# Patient Record
Sex: FEMALE | Race: WHITE | NOT HISPANIC OR LATINO | Employment: OTHER | URBAN - METROPOLITAN AREA
[De-identification: names, ages, dates, MRNs, and addresses within clinical notes are randomized per-mention and may not be internally consistent; named-entity substitution may affect disease eponyms.]

---

## 2017-06-13 ENCOUNTER — TRANSCRIBE ORDERS (OUTPATIENT)
Dept: ADMINISTRATIVE | Facility: HOSPITAL | Age: 72
End: 2017-06-13

## 2017-06-13 DIAGNOSIS — I70.213 ATHEROSCLEROSIS OF NATIVE ARTERY OF BOTH LOWER EXTREMITIES WITH INTERMITTENT CLAUDICATION (HCC): ICD-10-CM

## 2017-06-13 DIAGNOSIS — Z12.31 ENCOUNTER FOR SCREENING MAMMOGRAM FOR MALIGNANT NEOPLASM OF BREAST: Primary | ICD-10-CM

## 2017-06-13 DIAGNOSIS — I65.29 OCCLUSION AND STENOSIS OF UNSPECIFIED CAROTID ARTERY: ICD-10-CM

## 2017-06-13 DIAGNOSIS — I70.0 ATHEROSCLEROSIS OF AORTA (HCC): ICD-10-CM

## 2017-06-29 ENCOUNTER — HOSPITAL ENCOUNTER (OUTPATIENT)
Dept: RADIOLOGY | Facility: MEDICAL CENTER | Age: 72
Discharge: HOME | End: 2017-06-29
Payer: MEDICARE

## 2017-06-29 DIAGNOSIS — Z12.31 ENCOUNTER FOR SCREENING MAMMOGRAM FOR MALIGNANT NEOPLASM OF BREAST: ICD-10-CM

## 2017-07-03 ENCOUNTER — HOSPITAL ENCOUNTER (OUTPATIENT)
Dept: NON INVASIVE DIAGNOSTICS | Facility: CLINIC | Age: 72
Discharge: HOME | End: 2017-07-03
Payer: MEDICARE

## 2017-07-03 DIAGNOSIS — I70.0 ATHEROSCLEROSIS OF AORTA (HCC): ICD-10-CM

## 2017-07-03 DIAGNOSIS — I65.29 OCCLUSION AND STENOSIS OF UNSPECIFIED CAROTID ARTERY: ICD-10-CM

## 2017-07-03 DIAGNOSIS — I70.213 ATHEROSCLEROSIS OF NATIVE ARTERY OF BOTH LOWER EXTREMITIES WITH INTERMITTENT CLAUDICATION (HCC): ICD-10-CM

## 2017-07-03 PROCEDURE — 93923 UPR/LXTR ART STDY 3+ LVLS: CPT

## 2017-07-03 PROCEDURE — 93925 LOWER EXTREMITY STUDY: CPT

## 2017-07-03 PROCEDURE — 93978 VASCULAR STUDY: CPT

## 2017-07-03 PROCEDURE — 93880 EXTRACRANIAL BILAT STUDY: CPT

## 2017-07-28 ENCOUNTER — HOSPITAL ENCOUNTER (OUTPATIENT)
Dept: RADIOLOGY | Facility: MEDICAL CENTER | Age: 72
Discharge: HOME | End: 2017-07-28
Payer: MEDICARE

## 2017-07-28 DIAGNOSIS — Z12.31 ENCOUNTER FOR SCREENING MAMMOGRAM FOR MALIGNANT NEOPLASM OF BREAST: ICD-10-CM

## 2017-07-28 PROCEDURE — G0202 SCR MAMMO BI INCL CAD: HCPCS

## 2017-07-28 PROCEDURE — 77063 BREAST TOMOSYNTHESIS BI: CPT

## 2017-07-31 ENCOUNTER — GENERIC CONVERSION - ENCOUNTER (OUTPATIENT)
Dept: OTHER | Facility: OTHER | Age: 72
End: 2017-07-31

## 2018-01-13 NOTE — MISCELLANEOUS
Message  pt called req refill on nictoine patches  She is doing well, is not smoking  she's afraid to not use the patch yet, she doesn't want to relapse  per DERIC VÁSQUEZ ok to refill  refill submitted to pt's pharmacy  Active Problems    1  Abdominal aortic atherosclerosis (440 0) (I70 0)   2  Arterial ectasia (447 8) (I77 9)   3  Atherosclerosis of native artery of both lower extremities with intermittent claudication   (440 21) (I70 213)   4  Carotid artery stenosis (433 10) (I65 29)   5  Hypertension (401 9) (I10)   6  Nicotine dependence (305 1) (F17 200)    Current Meds   1  Adult Aspirin EC Low Strength 81 MG Oral Tablet Delayed Release; TAKE 1 TABLET   DAILY; Therapy: [id number] to (Lucretia Jaffe)  Requested for: [id number]; Last   Rx:[id number] Ordered   2  ALPRAZolam 0 25 MG Oral Tablet; Therapy: (Recorded:17Jun2013) to Recorded   3  AmLODIPine Besylate 5 MG Oral Tablet; Therapy: (Recorded:13May2016) to Recorded   4  CloNIDine HCl - 0 2 MG Oral Tablet; Therapy: (Recorded:17Jun2013) to Recorded   5  Losartan Potassium-HCTZ 100-25 MG Oral Tablet; Therapy: (Recorded:17Jun2013) to Recorded   6  Metoprolol Succinate  MG Oral Tablet Extended Release 24 Hour; Therapy: (Recorded:17Jun2013) to Recorded   7  Nicotine 14 MG/24HR Transdermal Patch 24 Hour; APPLY 1 PATCH DAILY AS   DIRECTED; Therapy: [id number] to (Evaluate:12Jun2016)  Requested for: 45VJF9392; Last   Rx:[id number] Ordered    Allergies    1   Codeine Sulfate TABS    Plan  Nicotine dependence    · Nicotine 14 MG/24HR Transdermal Patch 24 Hour; APPLY 1 PATCH DAILY AS  DIRECTED    Signatures   Electronically signed by : Petey Rivera, ; Jun 30 2016  3:56PM EST                       (Author)

## 2018-01-16 NOTE — RESULT NOTES
Verified Results  MAMMO SCREENING BILATERAL W 3D & CAD [id number] 02:28PM Julio Luz     Test Name Result Flag Reference   MAMMO SCREENING BILATERAL W 3D & CAD (Report)     Patient History:   Patient is postmenopausal and had first child at age 35  Family history of ovarian cancer at age 48 in maternal aunt,    endometrial cancer at age 46 in sister  No Hormone Replacement Therapy   Patient is an every day smoker, and has smoked for 53 years  Patient's BMI is 23 3  Reason for exam: screening, asymptomatic  Mammo Screening Bilateral W DBT and CAD: July 28, 2017 - Check In   #: [de-identified]   2D/3D Procedure   3D views: Bilateral MLO view(s) were taken  2D views: Bilateral CC view(s) were taken  Technologist: RT TANK Loyns   Prior study comparison: April 14, 2015, digital bilateral    screening mammogram, performed at [address]  April 8, 2014, digital bilateral screening mammogram, performed    at [address] [facility] [address]  June 27, 2012, digital bilateral   screening mammogram, performed at [address] [facility] [address]  June 22, 2011, digital bilateral screening mammogram, performed    at [address] [facility] [address]  December 18, 2009, digital    bilateral screening mammogram, performed at [address]  There are scattered fibroglandular densities  No dominant soft tissue mass, architectural distortion or    suspicious calcifications are noted in either breast  The skin    and nipple contours are within normal limits  No significant changes when compared with prior studies  ACR BI-RADSï¾® Assessments: BiRad:1 - Negative     Recommendation:   Routine screening mammogram of both breasts in 1 year  A    reminder letter will be scheduled  The patient is scheduled in a reminder system  8-10% of cancers will be missed on mammography  Management of a    palpable abnormality must be based on clinical grounds   Patients will be notified of their results via letter from our facility  Accredited by [employer] and FDA       Transcription Location: [address]: [id number]     Risk Value(s):   Tyrer-Cuzick 10 Year: 3 100%, Tyrer-Cuzick Lifetime: 4 500%,    Myriad Table: 3 0%, VIRGINIE 5 Year: 2 6%, NCI Lifetime: 7 2%

## 2018-01-17 NOTE — MISCELLANEOUS
Message  Received automated renewal request from [facility] pharmacy for Nicotine patch  At patient's last office visit we discussed smoking cessation and script was given for patches  Called her today and LMOM inquiring as to how smoking cessation was going and asked that she call the office if she does in fact need a refill on Nicotine patches or needs further assistance with cessation efforts        Signatures   Electronically signed by : Elia Mohan; Tyshawn 10 2016  4:36PM EST                       (Author)

## 2018-07-27 DIAGNOSIS — I70.213 ATHEROSCLEROSIS OF NATIVE ARTERY OF BOTH LOWER EXTREMITIES WITH INTERMITTENT CLAUDICATION (HCC): ICD-10-CM

## 2018-07-27 DIAGNOSIS — I65.23 BILATERAL CAROTID ARTERY STENOSIS: ICD-10-CM

## 2018-07-27 DIAGNOSIS — I70.0 ATHEROSCLEROSIS OF AORTA (HCC): Primary | ICD-10-CM

## 2018-08-31 ENCOUNTER — HOSPITAL ENCOUNTER (OUTPATIENT)
Dept: NON INVASIVE DIAGNOSTICS | Facility: CLINIC | Age: 73
Discharge: HOME | End: 2018-08-31
Payer: MEDICARE

## 2018-08-31 DIAGNOSIS — I70.0 ATHEROSCLEROSIS OF AORTA (HCC): ICD-10-CM

## 2018-08-31 DIAGNOSIS — I70.213 ATHEROSCLEROSIS OF NATIVE ARTERY OF BOTH LOWER EXTREMITIES WITH INTERMITTENT CLAUDICATION (HCC): ICD-10-CM

## 2018-08-31 DIAGNOSIS — I65.23 BILATERAL CAROTID ARTERY STENOSIS: ICD-10-CM

## 2018-08-31 PROCEDURE — 93922 UPR/L XTREMITY ART 2 LEVELS: CPT | Performed by: SURGERY

## 2018-08-31 PROCEDURE — 93978 VASCULAR STUDY: CPT | Performed by: SURGERY

## 2018-08-31 PROCEDURE — 93880 EXTRACRANIAL BILAT STUDY: CPT | Performed by: SURGERY

## 2018-08-31 PROCEDURE — 93923 UPR/LXTR ART STDY 3+ LVLS: CPT

## 2018-08-31 PROCEDURE — 93978 VASCULAR STUDY: CPT

## 2018-08-31 PROCEDURE — 93925 LOWER EXTREMITY STUDY: CPT | Performed by: SURGERY

## 2018-08-31 PROCEDURE — 93880 EXTRACRANIAL BILAT STUDY: CPT

## 2018-08-31 PROCEDURE — 93925 LOWER EXTREMITY STUDY: CPT

## 2018-09-18 ENCOUNTER — OFFICE VISIT (OUTPATIENT)
Dept: VASCULAR SURGERY | Facility: CLINIC | Age: 73
End: 2018-09-18
Payer: MEDICARE

## 2018-09-18 VITALS
WEIGHT: 146 LBS | SYSTOLIC BLOOD PRESSURE: 116 MMHG | DIASTOLIC BLOOD PRESSURE: 60 MMHG | BODY MASS INDEX: 24.92 KG/M2 | RESPIRATION RATE: 18 BRPM | TEMPERATURE: 97.9 F | HEART RATE: 72 BPM | HEIGHT: 64 IN

## 2018-09-18 DIAGNOSIS — I10 ESSENTIAL HYPERTENSION: ICD-10-CM

## 2018-09-18 DIAGNOSIS — I65.23 BILATERAL CAROTID ARTERY STENOSIS: ICD-10-CM

## 2018-09-18 DIAGNOSIS — E78.2 MIXED HYPERLIPIDEMIA: ICD-10-CM

## 2018-09-18 DIAGNOSIS — I70.213 ATHEROSCLEROSIS OF NATIVE ARTERY OF BOTH LOWER EXTREMITIES WITH INTERMITTENT CLAUDICATION (HCC): ICD-10-CM

## 2018-09-18 DIAGNOSIS — F17.210 CIGARETTE NICOTINE DEPENDENCE WITHOUT COMPLICATION: ICD-10-CM

## 2018-09-18 DIAGNOSIS — I71.4 ABDOMINAL AORTIC ANEURYSM (AAA) WITHOUT RUPTURE (HCC): Primary | ICD-10-CM

## 2018-09-18 PROBLEM — M54.9 BACK PAIN: Status: ACTIVE | Noted: 2017-11-03

## 2018-09-18 PROBLEM — I71.40 ABDOMINAL AORTIC ANEURYSM (AAA) WITHOUT RUPTURE: Status: ACTIVE | Noted: 2017-11-04

## 2018-09-18 PROCEDURE — 99214 OFFICE O/P EST MOD 30 MIN: CPT | Performed by: NURSE PRACTITIONER

## 2018-09-18 RX ORDER — ALPRAZOLAM 0.25 MG/1
TABLET ORAL
COMMUNITY
Start: 2018-08-17

## 2018-09-18 RX ORDER — METOPROLOL SUCCINATE 50 MG/1
50 TABLET, EXTENDED RELEASE ORAL
COMMUNITY
Start: 2017-10-24 | End: 2019-09-12 | Stop reason: ALTCHOICE

## 2018-09-18 RX ORDER — LOSARTAN POTASSIUM AND HYDROCHLOROTHIAZIDE 12.5; 1 MG/1; MG/1
TABLET ORAL
COMMUNITY
Start: 2018-09-05

## 2018-09-18 RX ORDER — ATORVASTATIN CALCIUM 40 MG/1
40 TABLET, FILM COATED ORAL
COMMUNITY
Start: 2017-10-24 | End: 2022-07-28

## 2018-09-18 RX ORDER — AMLODIPINE BESYLATE 5 MG/1
TABLET ORAL
COMMUNITY
Start: 2018-09-05

## 2018-09-18 NOTE — ASSESSMENT & PLAN NOTE
B/L asymptomatic carotid artery stenosis  -8/31/18 Carotid duplex reviewed with patient; 1-49% B/L stenosis  -patient denies any signs or symptoms of TIA/CVA including changes in vision, amaurosis fugax, unilateral extremity weaknesses, slurred speech or speech difficulties   -She knows to monitor for s/s of TIA/CVA and expressed understanding of calling 911 to go to the ER for evaluation

## 2018-09-18 NOTE — ASSESSMENT & PLAN NOTE
Infrarenal AAA without rupture  -8/31/18 AOI with stable infrarenal AAA 2 4cm  -denies any chest pain, back pain, abdominal pain  -continue on ASA, statin therapy  -continue with smoking cessation; counseling provided  -annual surveillance

## 2018-09-18 NOTE — LETTER
September 18, 2018     [address]  [address] [address]    Patient: Lazara Drake   YOB: 1945   Date of Visit: 9/18/2018       Dear Dr Mickey Howell: Thank you for referring Zak Petit to me for evaluation  Below are my notes for this consultation  If you have questions, please do not hesitate to call me  I look forward to following your patient along with you           Sincerely,        THALIA Velazquez        CC: No Recipients

## 2018-09-18 NOTE — ASSESSMENT & PLAN NOTE
PAD with intermittent claudication and intermittent rest pain  -patient reports very occasional claudication and rest pain  -she is ambulating without difficulty  -she is on ASA/Statin therapy  -we discussed the importance of walking daily, pushing through the pain or taking a break and continuing  She expressed understanding  We also discussed smoking cessation and the relationship of nicotine and atherosclerotic disease    She did quit for a few months, she will try again    -we discussed symptoms of PAD, she will call for re-evaluation if her symptoms worsen

## 2018-09-18 NOTE — PROGRESS NOTES
Assessment/Plan:    Abdominal aortic aneurysm (AAA) without rupture (HCC)  Infrarenal AAA without rupture  -8/31/18 AOI with stable infrarenal AAA 2 4cm  -denies any chest pain, back pain, abdominal pain  -continue on ASA, statin therapy  -continue with smoking cessation; counseling provided  -annual surveillance    Atherosclerosis of native artery of both lower extremities with intermittent claudication (HCC)  PAD with intermittent claudication and intermittent rest pain  -patient reports very occasional claudication and rest pain  -she is ambulating without difficulty  -she is on ASA/Statin therapy  -we discussed the importance of walking daily, pushing through the pain or taking a break and continuing  She expressed understanding  We also discussed smoking cessation and the relationship of nicotine and atherosclerotic disease  She did quit for a few months, she will try again    -we discussed symptoms of PAD, she will call for re-evaluation if her symptoms worsen    Carotid artery stenosis  B/L asymptomatic carotid artery stenosis  -8/31/18 Carotid duplex reviewed with patient; 1-49% B/L stenosis  -patient denies any signs or symptoms of TIA/CVA including changes in vision, amaurosis fugax, unilateral extremity weaknesses, slurred speech or speech difficulties   -She knows to monitor for s/s of TIA/CVA and expressed understanding of calling 911 to go to the ER for evaluation     Essential hypertension  HTN managed by PCP      Mixed hyperlipidemia  Continue on Statin therapy    Nicotine dependence  Patient reports she is smoking 6-8 cigarettes a day which is decreased from her previous use  She has been able to quit in the past and will attempt again  Counseling and education provided  Patient expressed understanding  Diagnoses and all orders for this visit:    Abdominal aortic aneurysm (AAA) without rupture ([address] )  -     VAS abdominal aorta/iliacs; complete study;  Future    Atherosclerosis of native artery of both lower extremities with intermittent claudication (HCC)  -     VAS lower limb arterial duplex, complete bilateral; Future  -     VAS abdominal aorta/iliacs; complete study; Future    Bilateral carotid artery stenosis  -     VAS carotid complete study; Future    Essential hypertension    Mixed hyperlipidemia    Cigarette nicotine dependence without complication    Other orders  -     aspirin 81 MG tablet; Take 1 tablet by mouth daily  -     ALPRAZolam (XANAX) 0 25 mg tablet;   -     amLODIPine (NORVASC) 5 mg tablet;   -     atorvastatin (LIPITOR) 40 mg tablet; Take 40 mg by mouth  -     cholecalciferol (VITAMIN D3) 1,000 units tablet; Take 1,000 Units by mouth  -     Thiamine HCl (VITAMIN B-1 PO); Take 1 tablet by mouth  -     losartan-hydrochlorothiazide (HYZAAR) 100-12 5 MG per tablet;   -     Flaxseed Oil OIL; Take 1 tablet by mouth  -     metoprolol succinate (TOPROL-XL) 50 mg 24 hr tablet; Take 50 mg by mouth  -     Multiple Vitamins-Minerals (MULTIVITAMIN ADULT PO); Take 1 capsule by mouth          Subjective:      Patient ID: Olivia Palmer is a 67 y o  female  Patient had BLAYNE, AOIL and CV on 8/31  She complaints of intermittent rest pain in L calf  She has intermittent abdominal pain and bloating  She has chronic back pain that is constant  She denies TIA or CVA like symptoms  She admits to tobacco use less than 1/2 PPD  Rona Bernheim is a 60-year-old female with past medical history of HTN, HLD, P 80, infrarenal aneurysm, smoker who presents to review her recent testing  Patient had carotid artery duplex on 08/31/2018 which was reviewed with the patient  Patient denies any signs and symptoms of TIA/CVA including amaurosis fugax or vision changes, unilateral extremity weaknesses, speech or swallowing difficulties  She is still smoking 6-8 cigarettes per day  She is taking aspirin every other day and is on statin therapy      Patient had AOI and LEAD on 08/31/2018 which was reviewed with the patient  Patient reports that she has some pain to her left leg which occurs on occasion at night and very occasionally while she is walking  She does walk, take a break for about 1 minutes the pain subsided and she is able to continue with walking she denies any wounds or ulcerations to either of her legs  Patient also denies any back pain, chest pain, abdominal pain  She denies any lightheadedness or dizziness  The following portions of the patient's history were reviewed and updated as appropriate: allergies, current medications, past family history, past medical history, past social history, past surgical history and problem list     Review of Systems   Constitutional: Negative  Negative for chills and fever  HENT: Negative  Eyes: Negative  Respiratory: Negative  Negative for shortness of breath  Cardiovascular: Negative for chest pain, palpitations and leg swelling  Gastrointestinal: Positive for abdominal pain  Endocrine: Negative  Genitourinary: Negative  Musculoskeletal: Positive for arthralgias and back pain  Skin: Negative  Allergic/Immunologic: Negative  Neurological: Negative  Negative for dizziness, facial asymmetry, speech difficulty, weakness, light-headedness, numbness and headaches  Hematological: Negative  Psychiatric/Behavioral: Negative  Objective:      /60 (BP Location: Right arm, Patient Position: Sitting, Cuff Size: Adult)   Pulse 72   Temp 97 9 °F (36 6 °C) (Tympanic)   Resp 18   Ht 5' 4" (1 626 m)   Wt 66 2 kg (146 lb)   BMI 25 06 kg/m²          Physical Exam   Constitutional: She is oriented to person, place, and time  She appears well-developed and well-nourished  No distress  HENT:   Head: Normocephalic and atraumatic  Eyes: EOM are normal  Pupils are equal, round, and reactive to light  No scleral icterus  Neck: Normal range of motion  Carotid bruit is present         Cardiovascular: Normal rate, regular rhythm and normal heart sounds  Pulses:       Carotid pulses are 2+ on the right side, and 2+ on the left side  Radial pulses are 2+ on the right side, and 2+ on the left side  Dorsalis pedis pulses are 1+ on the right side, and 2+ on the left side  Posterior tibial pulses are 0 on the right side, and 0 on the left side  Pulmonary/Chest: Effort normal and breath sounds normal  No respiratory distress  Abdominal: Soft  Bowel sounds are normal    Musculoskeletal: Normal range of motion  Neurological: She is alert and oriented to person, place, and time  She has normal strength  Skin: Skin is warm, dry and intact  Psychiatric: She has a normal mood and affect  Her speech is normal and behavior is normal  Judgment and thought content normal  Cognition and memory are normal    Nursing note and vitals reviewed  Vitals:    09/18/18 1411   BP: 116/60   BP Location: Right arm   Patient Position: Sitting   Cuff Size: Adult   Pulse: 72   Resp: 18   Temp: 97 9 °F (36 6 °C)   TempSrc: Tympanic   Weight: 66 2 kg (146 lb)   Height: 5' 4" (1 626 m)       Patient Active Problem List   Diagnosis    Abdominal aortic aneurysm (AAA) without rupture (HCC)    Atherosclerosis of native artery of both lower extremities with intermittent claudication (HCC)    Back pain    Bradycardia    Carotid artery stenosis    Cervical spinal stenosis    Diverticulosis of large intestine without hemorrhage    Essential hypertension    History of colonic polyps    Mixed hyperlipidemia    Nicotine dependence       History reviewed  No pertinent surgical history  Family History   Problem Relation Age of Onset    Congenital heart disease Mother     Lung cancer Father     Alcohol abuse Father        Social History     Social History    Marital status:      Spouse name: N/A    Number of children: N/A    Years of education: N/A     Occupational History    Not on file       Social History Main Topics  Smoking status: Current Every Day Smoker     Packs/day: 0 25    Smokeless tobacco: Never Used    Alcohol use Not on file    Drug use: Unknown    Sexual activity: Not on file     Other Topics Concern    Not on file     Social History Narrative    No narrative on file       Allergies   Allergen Reactions    Codeine Vomiting         Current Outpatient Prescriptions:     ALPRAZolam (XANAX) 0 25 mg tablet, , Disp: , Rfl:     amLODIPine (NORVASC) 5 mg tablet, , Disp: , Rfl:     aspirin 81 MG tablet, Take 1 tablet by mouth daily, Disp: , Rfl:     atorvastatin (LIPITOR) 40 mg tablet, Take 40 mg by mouth, Disp: , Rfl:     cholecalciferol (VITAMIN D3) 1,000 units tablet, Take 1,000 Units by mouth, Disp: , Rfl:     Flaxseed Oil OIL, Take 1 tablet by mouth, Disp: , Rfl:     losartan-hydrochlorothiazide (HYZAAR) 100-12 5 MG per tablet, , Disp: , Rfl:     metoprolol succinate (TOPROL-XL) 50 mg 24 hr tablet, Take 50 mg by mouth, Disp: , Rfl:     Multiple Vitamins-Minerals (MULTIVITAMIN ADULT PO), Take 1 capsule by mouth, Disp: , Rfl:     Thiamine HCl (VITAMIN B-1 PO), Take 1 tablet by mouth, Disp: , Rfl:

## 2018-09-18 NOTE — ASSESSMENT & PLAN NOTE
Patient reports she is smoking 6-8 cigarettes a day which is decreased from her previous use  She has been able to quit in the past and will attempt again  Counseling and education provided  Patient expressed understanding

## 2018-09-18 NOTE — PATIENT INSTRUCTIONS
Continue Aspirin and statin therapy daily as we discussed  Continue to exercise and ambulate daily  If you have any signs or symptoms of TIA/CVA as we discussed, call 911 immediately for assistance  If you have any change to the symptoms in your legs, please call for re-evaluation  Otherwise, we will do testing next year and see you in our office  Nonruptured Abdominal Aortic Aneurysm   AMBULATORY CARE:   What you need to know about an abdominal aortic aneurysm (AAA): The aorta is a large blood vessel that extends from your heart to your abdomen  The part of the aorta that extends into your abdomen is called your abdominal aorta  Your abdominal aorta brings blood to your stomach, pelvis, and legs  An AAA is a bulging or weak area in your abdominal aorta  Over time, the bulge may grow and is at risk for tearing or rupturing  An AAA that ruptures is a life-threatening emergency  Signs and symptoms: An AAA usually does not have signs or symptoms if it has not ruptured  If the AAA starts to leak or ruptures, you may have any of the following:  · Sudden pain in your abdomen, groin, back, legs, or buttocks    · Nausea and vomiting    · A lump or swelling in your abdomen    · Stiff abdominal muscles    · Numbness or tingling in your legs    · Pale, sweaty, or clammy skin    · Dizziness, fainting or loss of consciousness  Call 911 or have someone else call for any of the following:   · You faint or lose consciousness  · You cannot be woken  Seek care immediately if:  The following signs or symptoms may mean the AAA is at risk of rupturing:  · You have sudden sharp pain in your abdomen, groin, back, legs, or buttocks  · You have nausea and vomiting  · You feel dizzy  · You have stiffness or swelling in your abdomen, or a lump in your abdomen  · You have numbness or tingling in your legs  · Your skin is pale, sweaty, or clammy    Contact your healthcare provider if:   · You have questions or concerns about your condition or care  Treatment of an AAA  may not be needed  Your healthcare provider may monitor the size of your AAA with tests, such as an ultrasound  You may be given medicines to prevent the AAA from growing  Examples include blood pressure medicine and medicine to lower your cholesterol  If your AAA gets bigger, starts to leak, or ruptures, you may need any of the following:  · Endovascular repair  is a procedure that uses a graft to repair your AAA  The graft stops blood flow to the aneurysm and protects your abdominal aorta  You may need to have more than 1 endovascular repair  · Open repair  is surgery to repair or remove an AAA  Manage a nonruptured AAA:  You can help prevent your AAA from growing or rupturing by doing the following:  · Do not smoke  Nicotine and other chemicals in cigarettes and cigars can increase your blood pressure  It can also damage your aorta and increase the size of your AAA  Ask your healthcare provider for information if you currently smoke and need help to quit  E-cigarettes or smokeless tobacco still contain nicotine  Talk to your healthcare provider before you use these products  · Exercise as directed  Exercise can help control your blood pressure and cholesterol level  Ask your healthcare provider how much exercise you need each day and which exercises are best for you  · Follow the meal plan recommended by your healthcare provider  Talk to your dietitian about a heart-healthy or low-sodium eating plan  Meal plans will help you lower your cholesterol and blood pressure  They will also help you reach a healthy weight  · Do not lift anything heavier than 10 pounds  Heavy lifting can increase pressure in your abdominal aorta  This can increase your risk for a ruptured AAA  Follow up with your healthcare provider as directed: You will need regular tests and follow-up visits to monitor the size of your AAA   Keep all appointments  Write down your questions so you remember to ask them during your visits  © 2017 [employer] Information is for End User's use only and may not be sold, redistributed or otherwise used for commercial purposes  All illustrations and images included in CareNotes® are the copyrighted property of [employer]  or [employer]hakar [employer]  The above information is an  only  It is not intended as medical advice for individual conditions or treatments  Talk to your doctor, nurse or pharmacist before following any medical regimen to see if it is safe and effective for you  Peripheral Artery Disease   WHAT YOU NEED TO KNOW:   What is peripheral artery disease? Peripheral artery disease (PAD) is narrow, weak, or blocked arteries  It may affect any arteries outside of your heart and brain  PAD is usually the result of a buildup of fat and cholesterol, also called plaque, along your artery walls  Inflammation, a blood clot, or abnormal cell growth could also block your arteries  PAD prevents normal blood flow to your legs and arms  You are at risk of an amputation if poor blood flow keeps wounds from healing or causes gangrene (tissue death)  Without treatment, PAD can also cause a heart attack or stroke  What increases my risk for PAD? · Smoking cigarettes     · Diabetes     · High blood pressure     · High cholesterol     · Age older than 40 years    · Heart disease or a family history of heart disease  What are the signs and symptoms of PAD? Mild PAD usually does not cause symptoms   As the disease worsens over time, you may have the following:  · Pain or cramps in your leg or hip while you walk     · A numb, weak, or heavy feeling in your legs     · Dry, scaly, red, or pale skin on your legs     · Thick or brittle nails, or hair loss on your arms and legs     · Foot sores that will not heal     · Burning or aching in your feet and toes while resting (this may be worse when you lie down)  How is PAD diagnosed? · Angiography  is a test that shows pictures of the arteries in your arms and legs  You will be given contrast liquid to help the arteries show up better on the pictures  The pictures will be taken with an MRI or CT scan  Tell the healthcare provider if you have ever had an allergic reaction to contrast liquid  Do not enter the MRI room with anything metal  Metal can cause serious injury  Tell a healthcare provider if you have any metal in or on your body  · Doppler ankle brachial index (GEN)  is a test that compares blood pressure in your ankles to blood pressure in your arms  This tells your healthcare provider how well blood is flowing through the arteries in your legs  How is PAD treated? Treatment can help reduce your risk of a heart attack, stroke, or amputation  You may need more than one of the following:  · Medicines  may be given  Your healthcare provider may give you any of the following:     ¨ Antiplatelet medicine,  such as aspirin, helps prevent blood clots and reduces the risk of a heart attack or stroke  ¨ Statin medicine  helps lower your cholesterol and prevents your PAD from getting worse  · A supervised exercise program  helps you stay active in normal daily activities and may prevent disability  Healthcare providers will help you safely walk or do strength training exercises 3 times a week for 30 to 60 minutes  You will do this for several months, then transition to walking on your own  · Angioplasty  is a procedure to open your artery so blood can flow through normally  A thin tube called a catheter is used to insert a small balloon into your artery  The balloon is inflated to open your blocked artery, and then removed  A tube called a stent may be placed in your artery to hold it open  · Bypass surgery  is used to make a new connection to your artery with a vein from another part of your body, or an artificial graft   The vein or graft is attached to your artery above and below your blockage  This allows blood to flow around the blocked portion of your artery  How can I manage PAD? · Do not smoke  Nicotine and other chemicals in cigarettes and cigars can worsen PAD  They can also increase your risk for a heart attack or stroke  Ask your healthcare provider for information if you currently smoke and need help to quit  E-cigarettes or smokeless tobacco still contain nicotine  Talk to your healthcare provider before you use these products  · Manage other health conditions  Take your medicines as directed  Follow your healthcare provider's instructions if you have high blood pressure or high cholesterol  Perform foot care and check your blood sugar levels as directed if you have diabetes  · Eat heart healthy foods  Eat whole grains, fruits, and vegetables every day  Limit salt and high-fat foods  Ask your healthcare provider for more information on a heart healthy diet  Ask if you need to lose weight  Your healthcare provider can help you create a healthy weight-loss plan  Call 911 for the following:   · You have any of the following signs of a heart attack:      ¨ Squeezing, pressure, or pain in your chest that lasts longer than 5 minutes or returns    ¨ Discomfort or pain in your back, neck, jaw, stomach, or arm     ¨ Trouble breathing    ¨ Nausea or vomiting    ¨ Lightheadedness or a sudden cold sweat, especially with chest pain or trouble breathing    · You have any of the following signs of a stroke:      ¨ Numbness or drooping on one side of your face     ¨ Weakness in an arm or leg    ¨ Confusion or difficulty speaking    ¨ Dizziness, a severe headache, or vision loss  When should I seek immediate care? · You have sores or wounds that will not heal      · You notice black or discolored skin on your arm or leg  · Your skin is cool to the touch  When should I contact my healthcare provider?    · You have leg pain when you walk 1/8 mile (200 meters) or less, even with treatment  · Your legs are red, dry, or pale, even with treatment  · You have questions or concerns about your condition or care  CARE AGREEMENT:   You have the right to help plan your care  Learn about your health condition and how it may be treated  Discuss treatment options with your caregivers to decide what care you want to receive  You always have the right to refuse treatment  The above information is an  only  It is not intended as medical advice for individual conditions or treatments  Talk to your doctor, nurse or pharmacist before following any medical regimen to see if it is safe and effective for you  © 2017 [employer] Information is for End User's use only and may not be sold, redistributed or otherwise used for commercial purposes  All illustrations and images included in CareNotes® are the copyrighted property of [employer]  or [employer]ar Heath  Carotid Artery Disease   AMBULATORY CARE:   Carotid artery disease  is a condition that causes narrow or blocked carotid arteries  Your carotid arteries are the blood vessels that supply your brain with most of the blood it needs to work  You have 2 carotid arteries, one on each side of your neck          Call 911 for any of the following:   · You have any of the following signs of a stroke:      ¨ Numbness or drooping on one side of your face     ¨ Weakness in an arm or leg    ¨ Confusion or difficulty speaking    ¨ Dizziness, a severe headache, or vision loss    · You have any of the following signs of a heart attack:      ¨ Squeezing, pressure, or pain in your chest that lasts longer than 5 minutes or returns    ¨ Discomfort or pain in your back, neck, jaw, stomach, or arm     ¨ Trouble breathing    ¨ Nausea or vomiting    ¨ Lightheadedness or a sudden cold sweat, especially with chest pain or trouble breathing  Contact your healthcare provider if:   · You have questions or concerns about your condition or care  Signs and symptoms of carotid artery disease: You may have no signs or symptoms  Most commonly, carotid artery disease causes transient ischemic attacks (TIAs), or mini-strokes  You may have numbness, weakness, lack of movement, or vision or speech problems  A TIA goes away quickly and does not cause permanent damage  A TIA may be a warning sign that you are about to have a stroke  If you have any symptoms of a TIA or stroke, seek care immediately  Warning signs of a stroke: The word F A S T  can help you remember and recognize warning signs of a stroke  · F = Face:  One side of the face droops  · A = Arms:  One arm starts to drop when both arms are raised  · S = Speech:  Speech is slurred or sounds different than usual     · T = Time:  A person who is having a stroke needs to be seen immediately  A stroke is a medical emergency that needs immediate treatment  Some medicines and treatments work best if given within a few hours of a stroke  Treatment  for carotid artery disease depends on how narrow your arteries have become, your symptoms, and your general health  The goal of treatment is to lower your risk for a stroke  You may need any of the following:  · Take aspirin if directed  Your healthcare provider may suggest that you take an aspirin a day to prevent blood clots from forming in the carotid arteries  If your healthcare provider wants you to take aspirin daily, do not take acetaminophen or ibuprofen instead  · Control risk factors  High blood pressure, high cholesterol, heart disease, diabetes, and being overweight increase your risk for atherosclerosis  · Procedures can help open blocked arteries  A carotid endarterectomy is used to cut plaque out of the artery  An angioplasty is used to push the plaque against the artery wall with a balloon device  Sometimes a stent is placed during an angioplasty   A stent is a metal mesh tube that is placed in the artery to keep it open  Manage carotid artery disease:   · Eat a variety of healthy foods  Healthy foods include fruit, vegetables, whole-grain breads, low-fat dairy products, lean meat, and fish  Choose fish that are high in omega-3 fatty acids, such as salmon and fresh tuna  Ask your healthcare provider for more information on a heart healthy diet and the DASH eating plan  · Limit sodium (salt)  Sodium may increase your blood pressure  Add less table salt to your foods  Read food labels and choose foods that are low in sodium  Your healthcare provider may suggest you follow a low sodium diet  · Reach or maintain a healthy weight  Extra weight makes your heart work harder  Ask your healthcare provider how much you should weight  He can help you create a safe weight loss plan  Even a weight loss of 10% of your body weight can help your heart function better  · Exercise as directed  Exercise helps improve heart function and can help you manage your weight  Exercise can also help lower your cholesterol and blood sugar levels  Try to get at least 30 minutes of exercise at least 5 times each week  Try to be active every day  Your healthcare provider can help you create an exercise plan that works best for you  · Limit alcohol  Alcohol can increase your blood pressure and triglyceride levels  Men should limit alcohol to 2 drinks per day  Women should limit alcohol to 1 drink per day  A drink of alcohol is 12 ounces of beer, 5 ounces of wine, or 1½ ounces of liquor  · Do not smoke  Nicotine and other chemicals in cigarettes and cigars can cause heart and lung damage  Ask your healthcare provider for information if you currently smoke and need help to quit  E-cigarettes or smokeless tobacco still contain nicotine  Talk to your healthcare provider before you use these products    Follow up with your healthcare provider as directed:  Write down your questions so you remember to ask them during your visits  © 2017 [employer] Information is for End User's use only and may not be sold, redistributed or otherwise used for commercial purposes  All illustrations and images included in CareNotes® are the copyrighted property of [employer]  or [employer]  The above information is an  only  It is not intended as medical advice for individual conditions or treatments  Talk to your doctor, nurse or pharmacist before following any medical regimen to see if it is safe and effective for you

## 2018-10-22 ENCOUNTER — TRANSCRIBE ORDERS (OUTPATIENT)
Dept: ADMINISTRATIVE | Facility: HOSPITAL | Age: 73
End: 2018-10-22

## 2018-10-22 DIAGNOSIS — Z12.31 ENCOUNTER FOR SCREENING MAMMOGRAM FOR MALIGNANT NEOPLASM OF BREAST: Primary | ICD-10-CM

## 2018-11-08 PROBLEM — Z86.010 HX OF COLONIC POLYPS: Status: ACTIVE | Noted: 2018-11-08

## 2018-11-08 PROBLEM — K57.90 DIVERTICULOSIS OF INTESTINE WITHOUT BLEEDING: Status: ACTIVE | Noted: 2018-11-08

## 2018-11-08 PROBLEM — Z86.0100 HX OF COLONIC POLYPS: Status: ACTIVE | Noted: 2018-11-08

## 2018-11-21 ENCOUNTER — HOSPITAL ENCOUNTER (OUTPATIENT)
Dept: RADIOLOGY | Facility: MEDICAL CENTER | Age: 73
Discharge: HOME | End: 2018-11-21
Payer: MEDICARE

## 2018-11-21 VITALS — BODY MASS INDEX: 24.92 KG/M2 | HEIGHT: 64 IN | WEIGHT: 146 LBS

## 2018-11-21 DIAGNOSIS — Z12.31 ENCOUNTER FOR SCREENING MAMMOGRAM FOR MALIGNANT NEOPLASM OF BREAST: ICD-10-CM

## 2018-11-21 PROCEDURE — 77063 BREAST TOMOSYNTHESIS BI: CPT

## 2018-11-21 PROCEDURE — 77067 SCR MAMMO BI INCL CAD: CPT

## 2018-12-03 ENCOUNTER — ANESTHESIA EVENT (OUTPATIENT)
Dept: GASTROENTEROLOGY | Facility: HOSPITAL | Age: 73
End: 2018-12-03
Payer: MEDICARE

## 2018-12-03 ENCOUNTER — ANESTHESIA (OUTPATIENT)
Dept: GASTROENTEROLOGY | Facility: HOSPITAL | Age: 73
End: 2018-12-03
Payer: MEDICARE

## 2018-12-03 ENCOUNTER — HOSPITAL ENCOUNTER (OUTPATIENT)
Facility: HOSPITAL | Age: 73
Setting detail: OUTPATIENT SURGERY
Discharge: HOME | End: 2018-12-03
Attending: COLON & RECTAL SURGERY | Admitting: COLON & RECTAL SURGERY
Payer: MEDICARE

## 2018-12-03 VITALS
OXYGEN SATURATION: 96 % | RESPIRATION RATE: 18 BRPM | SYSTOLIC BLOOD PRESSURE: 134 MMHG | HEART RATE: 53 BPM | WEIGHT: 146 LBS | TEMPERATURE: 97.2 F | HEIGHT: 64 IN | DIASTOLIC BLOOD PRESSURE: 61 MMHG | BODY MASS INDEX: 24.92 KG/M2

## 2018-12-03 DIAGNOSIS — Z86.010 HX OF COLONIC POLYPS: ICD-10-CM

## 2018-12-03 DIAGNOSIS — K57.90 DIVERTICULOSIS OF INTESTINE WITHOUT BLEEDING, UNSPECIFIED INTESTINAL TRACT LOCATION: ICD-10-CM

## 2018-12-03 PROCEDURE — 88305 TISSUE EXAM BY PATHOLOGIST: CPT | Performed by: PATHOLOGY

## 2018-12-03 PROCEDURE — 99213 OFFICE O/P EST LOW 20 MIN: CPT | Performed by: COLON & RECTAL SURGERY

## 2018-12-03 PROCEDURE — 45385 COLONOSCOPY W/LESION REMOVAL: CPT | Performed by: COLON & RECTAL SURGERY

## 2018-12-03 RX ORDER — SODIUM CHLORIDE 9 MG/ML
50 INJECTION, SOLUTION INTRAVENOUS CONTINUOUS
Status: DISCONTINUED | OUTPATIENT
Start: 2018-12-03 | End: 2018-12-03 | Stop reason: HOSPADM

## 2018-12-03 RX ORDER — LIDOCAINE HYDROCHLORIDE 10 MG/ML
INJECTION, SOLUTION INFILTRATION; PERINEURAL AS NEEDED
Status: DISCONTINUED | OUTPATIENT
Start: 2018-12-03 | End: 2018-12-03 | Stop reason: SURG

## 2018-12-03 RX ORDER — PROPOFOL 10 MG/ML
INJECTION, EMULSION INTRAVENOUS CONTINUOUS PRN
Status: DISCONTINUED | OUTPATIENT
Start: 2018-12-03 | End: 2018-12-03 | Stop reason: SURG

## 2018-12-03 RX ORDER — PROPOFOL 10 MG/ML
INJECTION, EMULSION INTRAVENOUS AS NEEDED
Status: DISCONTINUED | OUTPATIENT
Start: 2018-12-03 | End: 2018-12-03 | Stop reason: SURG

## 2018-12-03 RX ADMIN — LIDOCAINE HYDROCHLORIDE 50 MG: 10 INJECTION, SOLUTION INFILTRATION; PERINEURAL at 09:21

## 2018-12-03 RX ADMIN — PROPOFOL 150 MCG/KG/MIN: 10 INJECTION, EMULSION INTRAVENOUS at 09:22

## 2018-12-03 RX ADMIN — SODIUM CHLORIDE: 9 INJECTION, SOLUTION INTRAVENOUS at 09:18

## 2018-12-03 RX ADMIN — PROPOFOL 100 MG: 10 INJECTION, EMULSION INTRAVENOUS at 09:21

## 2018-12-03 NOTE — ANESTHESIA PREPROCEDURE EVALUATION
Review of Systems/Medical History    Chart reviewed  No history of anesthetic complications     Cardiovascular  Exercise tolerance (METS): >4,  Hyperlipidemia, Hypertension ,   Comment: 2 4 AAA,  Pulmonary  Negative pulmonary ROS   Comment: Quit smoking 3 weeks ago     GI/Hepatic  Negative GI/hepatic ROS          Negative  ROS        Endo/Other  Negative endo/other ROS      GYN       Hematology  Negative hematology ROS      Musculoskeletal       Neurology  Negative neurology ROS      Psychology           Physical Exam    Airway    Mallampati score: II  TM Distance: >3 FB  Neck ROM: full     Dental   No notable dental hx     Cardiovascular      Pulmonary      Other Findings        Anesthesia Plan  ASA Score- 2     Anesthesia Type- IV sedation with anesthesia with ASA Monitors  Additional Monitors:   Airway Plan:     Comment: GA backup  Plan Factors-    Induction- intravenous  Postoperative Plan-     Informed Consent- Anesthetic plan and risks discussed with patient  I personally reviewed this patient with the CRNA  Discussed and agreed on the Anesthesia Plan with the CRNA  Dixie Guaman Pt took Losartan and Amlodipine this morning  She is not due for Toprol again until tonight   Carol Gonzáles MD

## 2018-12-03 NOTE — OP NOTE
Colonoscopy Procedure Note  PATIENT NAME: Kailee Juarez    :  1945  MRN: [id number]  Pt Location: [facility] ROOM 03    SURGERY DATE: 12/3/2018    Surgeon(s) and Role:     * Jessica Slade MD - Primary    Preop Diagnosis:  Diverticulosis of intestine without bleeding, unspecified intestinal tract location [K57 90]  Hx of colonic polyps [Z86 010]    Post-Op Diagnosis Codes:     * Diverticulosis of intestine without bleeding, unspecified intestinal tract location [K57 90]     * Hx of colonic polyps [Z86 010]    Procedure(s) (LRB):  COLONOSCOPY (N/A)    Specimen(s):  ID Type Source Tests Collected by Time Destination   1 : Ascending polyp-hot snare Tissue Polyp, Colorectal TISSUE EXAM Jessica Slade MD 12/3/2018 0930        Estimated Blood Loss:   Minimal    Drains:       Anesthesia Type:   IV Sedation with Anesthesia    Operative Indications:  Diverticulosis of intestine without bleeding, unspecified intestinal tract location [K57 90]  Hx of colonic polyps [Z86 010]    Procedure Details     Informed consent was obtained for the procedure, including sedation  Risks of perforation, hemorrhage, adverse drug reaction and aspiration were discussed  The patient was placed in the left lateral decubitus position  Based on the pre-procedure assessment, including review of the patient's medical history, medications, allergies, and review of systems, she had been deemed to be an appropriate candidate for conscious sedation; she was therefore sedated with the medications listed below  The patient was monitored continuously with ECG tracing, pulse oximetry, blood pressure monitoring, and direct observations  A rectal examination was performed  The variable-stiffness pediatric colonoscope was inserted into the rectum and advanced under direct vision to the cecum, which was identified by the ileocecal valve and appendiceal orifice  The quality of the colonic preparation was excellent    A careful inspection was made as the colonoscope was withdrawn, including a retroflexed view of the rectum; findings and interventions are described below  Appropriate photodocumentation was obtained  Findings:  -diverticulosis, moderate in degree, involving the sigmoid  -polyp(s) - #1, 10 mm in size, located in the ascending colon, removed by snare cautery and retrieved for pathology             Complications:  None; patient tolerated the procedure well  Disposition: PACU            Condition: stable    Attending Attestation: I was present for the entire procedure    Impression:    -Benign appearing colon polyps, removed as above  Recommendations:  -Repeat colonoscopy in 3 years  ,   -Call [phone] with any signs or symptoms of diverticulitis or rectal bleeding due to  diverticular bleed        SIGNATURE: Ketty Higgins MD  DATE: December 3, 2018  TIME: 9:41 AM

## 2018-12-03 NOTE — H&P
History and Physical   Colon and Rectal Surgery   Gwendolyn Valente 68 y o  female MRN: [id number]  Unit/Bed#: [facility] [place] Encounter: [id number]  12/03/18   8:51 AM      No chief complaint on file  History of Present Illness   HPI:  Gwendolyn Valente is a 68 y o  female who presents with history of polyp        Historical Information   Past Medical History:   Diagnosis Date    History of colon polyps     Hyperlipidemia     Hypertension     PVD (peripheral vascular disease) (HCC)     femoral blockages    Ulcer of aorta (HCC)      Past Surgical History:   Procedure Laterality Date    BACK SURGERY      herniated disc    COLONOSCOPY         Meds/Allergies     Prescriptions Prior to Admission   Medication    ALPRAZolam (XANAX) 0 25 mg tablet    amLODIPine (NORVASC) 5 mg tablet    aspirin 81 MG tablet    atorvastatin (LIPITOR) 40 mg tablet    cholecalciferol (VITAMIN D3) 1,000 units tablet    Flaxseed Oil OIL    losartan-hydrochlorothiazide (HYZAAR) 100-12 5 MG per tablet    MAGNESIUM PO    metoprolol succinate (TOPROL-XL) 50 mg 24 hr tablet    Multiple Vitamins-Minerals (MULTIVITAMIN ADULT PO)    Thiamine HCl (VITAMIN B-1 PO)         Current Facility-Administered Medications:     sodium chloride 0 9 % infusion, 50 mL/hr, Intravenous, Continuous, Jeanette Hernandez MD    Allergies   Allergen Reactions    Codeine Vomiting         Social History   History   Alcohol Use    Yes     Comment: wine daily     History   Drug Use No     History   Smoking Status    Current Every Day Smoker    Packs/day: 0 25   Smokeless Tobacco    Never Used     Comment: last cigarette 3 weeks ago trying to quit         Family History:   Family History   Problem Relation Age of Onset    Congenital heart disease Mother     Lung cancer Father     Alcohol abuse Father     Endometrial cancer Sister 46    Ovarian cancer Maternal Aunt 50         Objective     Current Vitals:   Height: 5' 4" (162 6 cm) (12/03/18 0849)  Weight - Scale: 66 2 kg (146 lb) (12/03/18 0849)  No intake or output data in the 24 hours ending 12/03/18 0851    Physical Exam:  General: No acute distress  Eyes: Normal   ENT: Normal   Neck: No JVD  Pulm: Normal in A&P  CV: NSR no murmur  Abdomen: Soft and normal on palpation, no mass, no tenderness, no guarding  Rectal: Normal sphincter tone, no perianal skin lesions  Extremities: Normal  Lymphatics: Normal        Lab Results: I have personally reviewed pertinent lab results  Imaging: I have personally reviewed pertinent reports  Patient was consented by myself for procedure as explained earlier with all the risks and benefits described  All questions answered  ASSESSMENT:  Sylvia Unger is a 68 y o  female who presents with history of polyp  PLAN:  No Patient Care Coordination Note on file

## 2019-08-26 ENCOUNTER — TELEPHONE (OUTPATIENT)
Dept: VASCULAR SURGERY | Facility: CLINIC | Age: 74
End: 2019-08-26

## 2019-09-09 ENCOUNTER — HOSPITAL ENCOUNTER (OUTPATIENT)
Dept: NON INVASIVE DIAGNOSTICS | Facility: CLINIC | Age: 74
Discharge: HOME | End: 2019-09-09
Payer: MEDICARE

## 2019-09-09 DIAGNOSIS — I65.23 BILATERAL CAROTID ARTERY STENOSIS: ICD-10-CM

## 2019-09-09 DIAGNOSIS — I71.4 ABDOMINAL AORTIC ANEURYSM (AAA) WITHOUT RUPTURE (HCC): ICD-10-CM

## 2019-09-09 DIAGNOSIS — I70.213 ATHEROSCLEROSIS OF NATIVE ARTERY OF BOTH LOWER EXTREMITIES WITH INTERMITTENT CLAUDICATION (HCC): ICD-10-CM

## 2019-09-09 PROCEDURE — 93880 EXTRACRANIAL BILAT STUDY: CPT

## 2019-09-09 PROCEDURE — 93923 UPR/LXTR ART STDY 3+ LVLS: CPT

## 2019-09-09 PROCEDURE — 93925 LOWER EXTREMITY STUDY: CPT | Performed by: SURGERY

## 2019-09-09 PROCEDURE — 93978 VASCULAR STUDY: CPT

## 2019-09-09 PROCEDURE — 93925 LOWER EXTREMITY STUDY: CPT

## 2019-09-09 PROCEDURE — 93922 UPR/L XTREMITY ART 2 LEVELS: CPT | Performed by: SURGERY

## 2019-09-09 PROCEDURE — 93880 EXTRACRANIAL BILAT STUDY: CPT | Performed by: SURGERY

## 2019-09-09 PROCEDURE — 93978 VASCULAR STUDY: CPT | Performed by: SURGERY

## 2019-09-12 ENCOUNTER — OFFICE VISIT (OUTPATIENT)
Dept: VASCULAR SURGERY | Facility: CLINIC | Age: 74
End: 2019-09-12
Payer: MEDICARE

## 2019-09-12 VITALS
BODY MASS INDEX: 24.24 KG/M2 | DIASTOLIC BLOOD PRESSURE: 54 MMHG | HEIGHT: 64 IN | WEIGHT: 142 LBS | HEART RATE: 55 BPM | TEMPERATURE: 97 F | SYSTOLIC BLOOD PRESSURE: 108 MMHG

## 2019-09-12 DIAGNOSIS — I70.213 ATHEROSCLEROSIS OF NATIVE ARTERY OF BOTH LOWER EXTREMITIES WITH INTERMITTENT CLAUDICATION (HCC): ICD-10-CM

## 2019-09-12 DIAGNOSIS — I71.4 ABDOMINAL AORTIC ANEURYSM (AAA) WITHOUT RUPTURE (HCC): Primary | ICD-10-CM

## 2019-09-12 DIAGNOSIS — I65.23 BILATERAL CAROTID ARTERY STENOSIS: ICD-10-CM

## 2019-09-12 PROCEDURE — 1124F ACP DISCUSS-NO DSCNMKR DOCD: CPT | Performed by: NURSE PRACTITIONER

## 2019-09-12 PROCEDURE — 99213 OFFICE O/P EST LOW 20 MIN: CPT | Performed by: NURSE PRACTITIONER

## 2019-09-12 RX ORDER — METOPROLOL SUCCINATE 100 MG/1
TABLET, EXTENDED RELEASE ORAL
COMMUNITY
Start: 2019-09-12

## 2019-09-12 RX ORDER — HYDROCHLOROTHIAZIDE 12.5 MG/1
12.5 TABLET ORAL DAILY
Refills: 1 | COMMUNITY
Start: 2019-08-31

## 2019-09-12 RX ORDER — LOSARTAN POTASSIUM 100 MG/1
100 TABLET ORAL DAILY
Refills: 1 | COMMUNITY
Start: 2019-08-31

## 2019-09-12 NOTE — PROGRESS NOTES
Assessment/Plan:  77-year-old female with hypertension, hyperlipidemia, nicotine dependency, aortoiliac and peripheral arterial occlusive disease and mild carotid stenosis who presents to the office in follow-up to review arterial Dopplers 9/9/19  1  Peripheral arterial disease  -Bilateral SFA occlusions, right GEN 0 66/99/67 and left GEN 0 56/75/46  -Stable bilateral calf and thigh claudication symptoms  No ischemic rest pain or tissue loss  -Continue with exercise routine  -Continue antiplatelet and statin therapies  -We did discuss smoking cessation  She has slowly decreased her nicotine use from 3 packs per day to less than 1 pack per day of cigarettes though has been unable to completely quit  -Will repeat duplex in 1 year  -Return to the office in 1 year for review of studies or sooner should she develop worsening lower extremity symptoms  2  Carotid stenosis  -CV duplex showed less than 50% bilateral ICA stenosis  -Continue with surveillance duplex on a yearly basis  -Continue medical management  -Follow up in the office in 1 year for risk factor modification         Problem List Items Addressed This Visit        Cardiovascular and Mediastinum    Abdominal aortic aneurysm (AAA) without rupture ([address] ) - Primary    Relevant Orders    VAS abdominal aorta/iliacs; complete study    VAS lower limb arterial duplex, complete bilateral    Atherosclerosis of native artery of both lower extremities with intermittent claudication (HCC)    Relevant Orders    VAS abdominal aorta/iliacs; complete study    VAS lower limb arterial duplex, complete bilateral    Carotid artery stenosis    Relevant Orders    VAS carotid complete study            Subjective:      Patient ID: Dank Anderson is a 68 y o  female  Pt is here today to review results of AOIL, BLAYNE, and CV done 9/9/2019  Pt c/o cramping pain in her calves or both legs when walking up hill up to 1 block  She denies any rest pain or open wounds  Pt denies any abd pain, abd pain after eating,or low back pain  Pt denies any headaches, dizziness, sudden loss of vision, slurred speech, TIA or stroke symptoms  She does c/o trouble swallowing  Pt is taking ASA 81 mg and Atorvastatin 40 mg  She is a current smoker  HPI  60-year-old female with hypertension, hyperlipidemia, nicotine dependency, aortoiliac and peripheral arterial occlusive disease and mild carotid stenosis who presents to the office in follow-up to review arterial Dopplers 9/9/19  She has previously seen in the office last year by [facility]  She has bilateral SFA occlusions on duplex with decreased GEN 0 66/0 56 on the right and left  She has bilateral calf and thigh claudication symptoms present for years"  She denies any worsening of symptoms and back she reports that over the years with increasing her activity her lower extremity symptoms are improved  No limitation in daily activities  She does have some occasional numbness in the left great toe which has been present since digit fracture several years ago  She denies ischemic rest pain is not experiencing any tissue loss she continues to smoke less than 1 pack of cigarettes per day  She is able to quit smoking for 3 months at 1 point in time but cannot completely quit again  She has decreased from 3 packs per day to less than 1 pack of cigarettes per day  She does an aerobic exercise routine for 60mins daily  She has mildly ectatic aorta measured 2 5 cm on recent duplex with right renal presents with stenosis  Blood pressure well controlled  She is also followed for mild carotid disease  No history of stroke  CV duplex showed less than 50% bilateral ICA stenosis    The following portions of the patient's history were reviewed and updated as appropriate: allergies, current medications, past family history, past medical history, past social history, past surgical history and problem list   Review of systems reviewed    Review of Systems Constitutional: Negative  HENT: Negative  Eyes: Negative  Respiratory: Positive for cough  Cardiovascular: Negative  Gastrointestinal: Positive for constipation and diarrhea  Endocrine: Negative  Genitourinary: Negative  Musculoskeletal: Negative  Skin: Negative  Allergic/Immunologic: Negative  Neurological: Negative  Hematological: Bruises/bleeds easily  Psychiatric/Behavioral: The patient is nervous/anxious  Objective:    I have reviewed and made appropriate changes to the review of systems input by the medical assistant  Vitals:    09/12/19 1347   BP: 108/54   BP Location: Left arm   Patient Position: Sitting   Cuff Size: Standard   Pulse: 55   Temp: (!) 97 °F (36 1 °C)   TempSrc: Tympanic   Weight: 64 4 kg (142 lb)   Height: 5' 4" (1 626 m)       Patient Active Problem List   Diagnosis    Abdominal aortic aneurysm (AAA) without rupture (HCC)    Atherosclerosis of native artery of both lower extremities with intermittent claudication (HCC)    Back pain    Bradycardia    Carotid artery stenosis    Cervical spinal stenosis    Diverticulosis of large intestine without hemorrhage    Essential hypertension    History of colonic polyps    Mixed hyperlipidemia    Nicotine dependence    Diverticulosis of intestine without bleeding    Hx of colonic polyps       Past Surgical History:   Procedure Laterality Date    BACK SURGERY      herniated disc    COLONOSCOPY      KS COLONOSCOPY FLX DX W/COLLJ SPEC WHEN PFRMD N/A 12/3/2018    Procedure: COLONOSCOPY;  Surgeon: Monika Ureña MD;  Location: [facility]; Service: Colorectal       Family History   Problem Relation Age of Onset    Congenital heart disease Mother     Lung cancer Father     Alcohol abuse Father     Endometrial cancer Sister 46    Ovarian cancer Maternal Aunt 48       Social History     Socioeconomic History    Marital status:       Spouse name: Not on file    Number of children: Not on file    Years of education: Not on file    Highest education level: Not on file   Occupational History    Not on file   Social Needs    Financial resource strain: Not on file    Food insecurity:     Worry: Not on file     Inability: Not on file    Transportation needs:     Medical: Not on file     Non-medical: Not on file   Tobacco Use    Smoking status: Current Every Day Smoker     Packs/day: 0 25    Smokeless tobacco: Never Used    Tobacco comment: last cigarette 3 weeks ago trying to quit   Substance and Sexual Activity    Alcohol use: Yes     Comment: wine daily    Drug use: No    Sexual activity: Not on file   Lifestyle    Physical activity:     Days per week: Not on file     Minutes per session: Not on file    Stress: Not on file   Relationships    Social connections:     Talks on phone: Not on file     Gets together: Not on file     Attends Synagogue service: Not on file     Active member of club or organization: Not on file     Attends meetings of clubs or organizations: Not on file     Relationship status: Not on file    Intimate partner violence:     Fear of current or ex partner: Not on file     Emotionally abused: Not on file     Physically abused: Not on file     Forced sexual activity: Not on file   Other Topics Concern    Not on file   Social History Narrative    Not on file       Allergies   Allergen Reactions    Codeine Vomiting         Current Outpatient Medications:     ALPRAZolam (XANAX) 0 25 mg tablet, , Disp: , Rfl:     amLODIPine (NORVASC) 5 mg tablet, , Disp: , Rfl:     aspirin 81 MG tablet, Take 1 tablet by mouth daily, Disp: , Rfl:     cholecalciferol (VITAMIN D3) 1,000 units tablet, Take 1,000 Units by mouth, Disp: , Rfl:     Flaxseed Oil OIL, Take 1 tablet by mouth, Disp: , Rfl:     hydrochlorothiazide (HYDRODIURIL) 12 5 mg tablet, Take 12 5 mg by mouth daily, Disp: , Rfl: 1    losartan (COZAAR) 100 MG tablet, Take 100 mg by mouth daily, Disp: , Rfl: 1    metoprolol succinate (TOPROL-XL) 100 mg 24 hr tablet, , Disp: , Rfl:     Multiple Vitamins-Minerals (MULTIVITAMIN ADULT PO), Take 1 capsule by mouth, Disp: , Rfl:     atorvastatin (LIPITOR) 40 mg tablet, Take 40 mg by mouth, Disp: , Rfl:     losartan-hydrochlorothiazide (HYZAAR) 100-12 5 MG per tablet, , Disp: , Rfl:     MAGNESIUM PO, Take 1 tablet by mouth every other day, Disp: , Rfl:     Thiamine HCl (VITAMIN B-1 PO), Take 1 tablet by mouth, Disp: , Rfl:     /54 (BP Location: Left arm, Patient Position: Sitting, Cuff Size: Standard)   Pulse 55   Temp (!) 97 °F (36 1 °C) (Tympanic)   Ht 5' 4" (1 626 m)   Wt 64 4 kg (142 lb)   BMI 24 37 kg/m²          Physical Exam   Constitutional: She is oriented to person, place, and time  She appears well-developed and well-nourished  HENT:   Head: Normocephalic and atraumatic  Eyes: EOM are normal    Neck: Neck supple  Cardiovascular: Normal heart sounds  Pulses:       Carotid pulses are on the right side with bruit  Femoral pulses are 2+ on the right side, and 2+ on the left side  Dorsalis pedis pulses are 0 on the right side, and 0 on the left side  Posterior tibial pulses are 0 on the right side, and 0 on the left side  Pulmonary/Chest: Effort normal and breath sounds normal    Abdominal: Soft  Bowel sounds are normal    Musculoskeletal: Normal range of motion  Neurological: She is alert and oriented to person, place, and time  Skin: Skin is warm  Psychiatric: Her behavior is normal  Thought content normal    Nursing note and vitals reviewed

## 2019-09-12 NOTE — PATIENT INSTRUCTIONS
Peripheral Artery Disease   WHAT YOU NEED TO KNOW:   What is peripheral artery disease? Peripheral artery disease (PAD) is narrow, weak, or blocked arteries  It may affect any arteries outside of your heart and brain  PAD is usually the result of a buildup of fat and cholesterol, also called plaque, along your artery walls  Inflammation, a blood clot, or abnormal cell growth could also block your arteries  PAD prevents normal blood flow to your legs and arms  You are at risk of an amputation if poor blood flow keeps wounds from healing or causes gangrene (tissue death)  Without treatment, PAD can also cause a heart attack or stroke  What increases my risk for PAD? · Smoking cigarettes     · Diabetes     · High blood pressure     · High cholesterol     · Age older than 40 years    · Heart disease or a family history of heart disease  What are the signs and symptoms of PAD? Mild PAD usually does not cause symptoms  As the disease worsens over time, you may have the following:  · Pain or cramps in your leg or hip while you walk     · A numb, weak, or heavy feeling in your legs     · Dry, scaly, red, or pale skin on your legs     · Thick or brittle nails, or hair loss on your arms and legs     · Foot sores that will not heal     · Burning or aching in your feet and toes while resting (this may be worse when you lie down)  How is PAD diagnosed? · Angiography  is a test that shows pictures of the arteries in your arms and legs  You will be given contrast liquid to help the arteries show up better on the pictures  The pictures will be taken with an MRI or CT scan  Tell the healthcare provider if you have ever had an allergic reaction to contrast liquid  Do not enter the MRI room with anything metal  Metal can cause serious injury  Tell a healthcare provider if you have any metal in or on your body      · Doppler ankle brachial index (GEN)  is a test that compares blood pressure in your ankles to blood pressure in your arms  This tells your healthcare provider how well blood is flowing through the arteries in your legs  How is PAD treated? Treatment can help reduce your risk of a heart attack, stroke, or amputation  You may need more than one of the following:  · Medicines  may be given  Your healthcare provider may give you any of the following:     ¨ Antiplatelet medicine,  such as aspirin, helps prevent blood clots and reduces the risk of a heart attack or stroke  ¨ Statin medicine  helps lower your cholesterol and prevents your PAD from getting worse  · A supervised exercise program  helps you stay active in normal daily activities and may prevent disability  Healthcare providers will help you safely walk or do strength training exercises 3 times a week for 30 to 60 minutes  You will do this for several months, then transition to walking on your own  · Angioplasty  is a procedure to open your artery so blood can flow through normally  A thin tube called a catheter is used to insert a small balloon into your artery  The balloon is inflated to open your blocked artery, and then removed  A tube called a stent may be placed in your artery to hold it open  · Bypass surgery  is used to make a new connection to your artery with a vein from another part of your body, or an artificial graft  The vein or graft is attached to your artery above and below your blockage  This allows blood to flow around the blocked portion of your artery  How can I manage PAD? · Do not smoke  Nicotine and other chemicals in cigarettes and cigars can worsen PAD  They can also increase your risk for a heart attack or stroke  Ask your healthcare provider for information if you currently smoke and need help to quit  E-cigarettes or smokeless tobacco still contain nicotine  Talk to your healthcare provider before you use these products  · Manage other health conditions  Take your medicines as directed   Follow your healthcare provider's instructions if you have high blood pressure or high cholesterol  Perform foot care and check your blood sugar levels as directed if you have diabetes  · Eat heart healthy foods  Eat whole grains, fruits, and vegetables every day  Limit salt and high-fat foods  Ask your healthcare provider for more information on a heart healthy diet  Ask if you need to lose weight  Your healthcare provider can help you create a healthy weight-loss plan  Call 911 for the following:   · You have any of the following signs of a heart attack:      ¨ Squeezing, pressure, or pain in your chest that lasts longer than 5 minutes or returns    ¨ Discomfort or pain in your back, neck, jaw, stomach, or arm     ¨ Trouble breathing    ¨ Nausea or vomiting    ¨ Lightheadedness or a sudden cold sweat, especially with chest pain or trouble breathing    · You have any of the following signs of a stroke:      ¨ Numbness or drooping on one side of your face     ¨ Weakness in an arm or leg    ¨ Confusion or difficulty speaking    ¨ Dizziness, a severe headache, or vision loss  When should I seek immediate care? · You have sores or wounds that will not heal      · You notice black or discolored skin on your arm or leg  · Your skin is cool to the touch  When should I contact my healthcare provider? · You have leg pain when you walk 1/8 mile (200 meters) or less, even with treatment  · Your legs are red, dry, or pale, even with treatment  · You have questions or concerns about your condition or care  CARE AGREEMENT:   You have the right to help plan your care  Learn about your health condition and how it may be treated  Discuss treatment options with your caregivers to decide what care you want to receive  You always have the right to refuse treatment  The above information is an  only  It is not intended as medical advice for individual conditions or treatments   Talk to your doctor, nurse or pharmacist before following any medical regimen to see if it is safe and effective for you  © 2017 [employer] Information is for End User's use only and may not be sold, redistributed or otherwise used for commercial purposes  All illustrations and images included in CareNotes® are the copyrighted property of [employer]  or [employer]ak[employer]

## 2020-09-14 ENCOUNTER — HOSPITAL ENCOUNTER (OUTPATIENT)
Dept: NON INVASIVE DIAGNOSTICS | Facility: CLINIC | Age: 75
Discharge: HOME | End: 2020-09-14
Payer: MEDICARE

## 2020-09-14 DIAGNOSIS — I70.213 ATHEROSCLEROSIS OF NATIVE ARTERY OF BOTH LOWER EXTREMITIES WITH INTERMITTENT CLAUDICATION (HCC): ICD-10-CM

## 2020-09-14 DIAGNOSIS — I71.4 ABDOMINAL AORTIC ANEURYSM (AAA) WITHOUT RUPTURE (HCC): ICD-10-CM

## 2020-09-14 DIAGNOSIS — I65.23 BILATERAL CAROTID ARTERY STENOSIS: ICD-10-CM

## 2020-09-14 PROCEDURE — 93880 EXTRACRANIAL BILAT STUDY: CPT

## 2020-09-14 PROCEDURE — 93925 LOWER EXTREMITY STUDY: CPT

## 2020-09-14 PROCEDURE — 93978 VASCULAR STUDY: CPT

## 2020-09-14 PROCEDURE — 93923 UPR/LXTR ART STDY 3+ LVLS: CPT

## 2020-09-14 PROCEDURE — 93978 VASCULAR STUDY: CPT | Performed by: SURGERY

## 2020-09-14 PROCEDURE — 93880 EXTRACRANIAL BILAT STUDY: CPT | Performed by: SURGERY

## 2020-09-14 PROCEDURE — 93922 UPR/L XTREMITY ART 2 LEVELS: CPT | Performed by: SURGERY

## 2020-09-14 PROCEDURE — 93925 LOWER EXTREMITY STUDY: CPT | Performed by: SURGERY

## 2020-10-07 ENCOUNTER — TELEPHONE (OUTPATIENT)
Dept: VASCULAR SURGERY | Facility: CLINIC | Age: 75
End: 2020-10-07

## 2020-10-08 ENCOUNTER — OFFICE VISIT (OUTPATIENT)
Dept: VASCULAR SURGERY | Facility: CLINIC | Age: 75
End: 2020-10-08
Payer: MEDICARE

## 2020-10-08 VITALS
DIASTOLIC BLOOD PRESSURE: 72 MMHG | WEIGHT: 143 LBS | BODY MASS INDEX: 24.41 KG/M2 | HEIGHT: 64 IN | TEMPERATURE: 97.8 F | SYSTOLIC BLOOD PRESSURE: 114 MMHG | RESPIRATION RATE: 18 BRPM | HEART RATE: 72 BPM

## 2020-10-08 DIAGNOSIS — I65.29 STENOSIS OF CAROTID ARTERY, UNSPECIFIED LATERALITY: ICD-10-CM

## 2020-10-08 DIAGNOSIS — I71.4 ABDOMINAL AORTIC ANEURYSM (AAA) WITHOUT RUPTURE (HCC): Primary | ICD-10-CM

## 2020-10-08 DIAGNOSIS — I65.23 CAROTID STENOSIS, ASYMPTOMATIC, BILATERAL: ICD-10-CM

## 2020-10-08 PROCEDURE — 99214 OFFICE O/P EST MOD 30 MIN: CPT | Performed by: SURGERY

## 2021-01-13 ENCOUNTER — HOSPITAL ENCOUNTER (OUTPATIENT)
Dept: RADIOLOGY | Facility: MEDICAL CENTER | Age: 76
Discharge: HOME | End: 2021-01-13
Payer: MEDICARE

## 2021-01-13 VITALS — WEIGHT: 143 LBS | HEIGHT: 64 IN | BODY MASS INDEX: 24.41 KG/M2

## 2021-01-13 DIAGNOSIS — Z12.31 ENCOUNTER FOR SCREENING MAMMOGRAM FOR MALIGNANT NEOPLASM OF BREAST: ICD-10-CM

## 2021-01-13 PROCEDURE — 77067 SCR MAMMO BI INCL CAD: CPT

## 2021-01-13 PROCEDURE — 77063 BREAST TOMOSYNTHESIS BI: CPT

## 2021-01-20 DIAGNOSIS — Z23 ENCOUNTER FOR IMMUNIZATION: ICD-10-CM

## 2021-01-25 ENCOUNTER — IMMUNIZATIONS (OUTPATIENT)
Dept: FAMILY MEDICINE CLINIC | Facility: HOSPITAL | Age: 76
End: 2021-01-25

## 2021-01-25 DIAGNOSIS — Z23 ENCOUNTER FOR IMMUNIZATION: Primary | ICD-10-CM

## 2021-01-25 PROCEDURE — 91300 SARS-COV-2 / COVID-19 MRNA VACCINE (PFIZER-BIONTECH) 30 MCG: CPT

## 2021-01-25 PROCEDURE — 0001A SARS-COV-2 / COVID-19 MRNA VACCINE (PFIZER-BIONTECH) 30 MCG: CPT

## 2021-02-15 ENCOUNTER — IMMUNIZATIONS (OUTPATIENT)
Dept: FAMILY MEDICINE CLINIC | Facility: HOSPITAL | Age: 76
End: 2021-02-15

## 2021-02-15 DIAGNOSIS — Z23 ENCOUNTER FOR IMMUNIZATION: Primary | ICD-10-CM

## 2021-02-15 PROCEDURE — 91300 SARS-COV-2 / COVID-19 MRNA VACCINE (PFIZER-BIONTECH) 30 MCG: CPT

## 2021-02-15 PROCEDURE — 0002A SARS-COV-2 / COVID-19 MRNA VACCINE (PFIZER-BIONTECH) 30 MCG: CPT

## 2021-02-23 ENCOUNTER — OFFICE VISIT (OUTPATIENT)
Dept: PODIATRY | Facility: CLINIC | Age: 76
End: 2021-02-23
Payer: MEDICARE

## 2021-02-23 VITALS
BODY MASS INDEX: 24.41 KG/M2 | DIASTOLIC BLOOD PRESSURE: 69 MMHG | SYSTOLIC BLOOD PRESSURE: 159 MMHG | WEIGHT: 143 LBS | HEIGHT: 64 IN

## 2021-02-23 DIAGNOSIS — M79.674 PAIN IN TOE OF RIGHT FOOT: ICD-10-CM

## 2021-02-23 DIAGNOSIS — L60.0 INGROWN TOENAIL: ICD-10-CM

## 2021-02-23 DIAGNOSIS — I73.9 PERIPHERAL VASCULAR DISEASE, UNSPECIFIED (HCC): Primary | ICD-10-CM

## 2021-02-23 PROCEDURE — 11730 AVULSION NAIL PLATE SIMPLE 1: CPT | Performed by: PODIATRIST

## 2021-02-23 PROCEDURE — 99203 OFFICE O/P NEW LOW 30 MIN: CPT | Performed by: PODIATRIST

## 2021-02-23 RX ORDER — LIDOCAINE HYDROCHLORIDE 10 MG/ML
1 INJECTION, SOLUTION EPIDURAL; INFILTRATION; INTRACAUDAL; PERINEURAL ONCE
Status: COMPLETED | OUTPATIENT
Start: 2021-02-23 | End: 2021-02-23

## 2021-02-23 RX ADMIN — LIDOCAINE HYDROCHLORIDE 1 ML: 10 INJECTION, SOLUTION EPIDURAL; INFILTRATION; INTRACAUDAL; PERINEURAL at 14:57

## 2021-02-23 NOTE — PROGRESS NOTES
Assessment/Plan:     explained the patient that she appears to be dealing with an ingrown nail along the medial nail border of the right great toe although vascular complication cannot be ruled out  Discussed treatment options recommending partial avulsion  The patient is aware that ingrown nail will return in approximately 4-6 months with this procedure  A partial matrixectomy may be needed in the future  Anesthesia via 3 cc of 1 percent xylocaine plain  A Betadine prep was performed  The medial nail border of the right great toe was avulsed to the eponychium  A bacitracin dressing was applied  The patient is to soak in warm water twice a day followed by a Neosporin dressing tomorrow  Patient is rescheduled in 1 week  No problem-specific Assessment & Plan notes found for this encounter  Diagnoses and all orders for this visit:    Peripheral vascular disease, unspecified (HCC)    Ingrown toenail  -     lidocaine (PF) (XYLOCAINE-MPF) 1 % injection 1 mL    Pain in toe of right foot          Subjective:      Patient ID: Mckenzie Hernandez is a 76 y o  female  HPI      Patient, a 70-year-old female with known peripheral vascular disease presents with an apparent painful ingrown toenail along the medial nail border of the right great toe  Pain developed approximately 3 weeks ago  Patient had difficulty with ingrown nails years back but current discomfort is relatively unusual   Patient is under the care of Dr Pete Garvin or atherosclerosis of her legs  She goes for yearly arterial Doppler studies  Reviewed results of arterial Doppler studies dated 09/14/2020  They were positive for significant atherosclerosis  Great toe pressure right hallux is within healing range at 59  Reviewed arterial Dopplers of September of 2019  The great toe pressure was within the healing range at 67      The following portions of the patient's history were reviewed and updated as appropriate: allergies, current medications, past family history, past medical history, past social history, past surgical history and problem list     Review of Systems   Cardiovascular:        History of atherosclerosis   Gastrointestinal:        History of diverticulosis   Musculoskeletal: Positive for back pain  Objective:      /69 (BP Location: Left arm)   Ht 5' 4" (1 626 m)   Wt 64 9 kg (143 lb)   BMI 24 55 kg/m²          Physical Exam  Constitutional:       Appearance: Normal appearance  Cardiovascular:      Comments: No palpable pedal pulses  Absent hair growth from feet  Musculoskeletal: Normal range of motion  General: No deformity  Skin:     Comments: Pain with palpation medial nail border right great toe  Toenail appears incurvated along the medial nail border  No evidence of infection  Neurological:      General: No focal deficit present  Mental Status: She is oriented to person, place, and time  Nail removal    Date/Time: 2/23/2021 2:59 PM  Performed by: Aguila Apodaca DPM  Authorized by: Aguila Apodaca DPM     Patient location:  ClinicUniversal Protocol:  Consent: Verbal consent obtained  Risks and benefits: risks, benefits and alternatives were discussed  Consent given by: patient  Patient understanding: patient states understanding of the procedure being performed  Patient identity confirmed: verbally with patient      Location:     Foot:  R big toe  Pre-procedure details:     Skin preparation:  Betadine    Preparation: Patient was prepped and draped in the usual sterile fashion    Anesthesia (see MAR for exact dosages):      Anesthesia method:  Nerve block    Block anesthetic:  Lidocaine 1% w/o epi    Block injection procedure:  Anatomic landmarks identified    Block outcome:  Anesthesia achieved  Nail Removal:     Nail removed:  Partial    Nail side:  Medial    Nail bed sutured: no    Ingrown nail:     Wedge excision of skin: no      Nail matrix removed or ablated: None

## 2021-03-01 ENCOUNTER — OFFICE VISIT (OUTPATIENT)
Dept: PODIATRY | Facility: CLINIC | Age: 76
End: 2021-03-01
Payer: MEDICARE

## 2021-03-01 VITALS
BODY MASS INDEX: 24.41 KG/M2 | HEIGHT: 64 IN | DIASTOLIC BLOOD PRESSURE: 65 MMHG | WEIGHT: 143 LBS | SYSTOLIC BLOOD PRESSURE: 130 MMHG

## 2021-03-01 DIAGNOSIS — L60.0 INGROWN TOENAIL: Primary | ICD-10-CM

## 2021-03-01 DIAGNOSIS — I73.9 PERIPHERAL VASCULAR DISEASE, UNSPECIFIED (HCC): ICD-10-CM

## 2021-03-01 PROCEDURE — 99212 OFFICE O/P EST SF 10 MIN: CPT | Performed by: PODIATRIST

## 2021-03-01 NOTE — PROGRESS NOTES
Patient presents for assessment of right great toe  Patient had partial nail avulsion performed last week  Surgical site is healing uneventfully  Mild discomfort related within normal limits for procedure performed  No additional treatment is needed  Jak p r n

## 2021-05-28 ENCOUNTER — TELEPHONE (OUTPATIENT)
Dept: VASCULAR SURGERY | Facility: CLINIC | Age: 76
End: 2021-05-28

## 2021-05-28 DIAGNOSIS — I70.213 ATHEROSCLEROSIS OF NATIVE ARTERY OF BOTH LOWER EXTREMITIES WITH INTERMITTENT CLAUDICATION (HCC): Primary | ICD-10-CM

## 2021-05-28 NOTE — TELEPHONE ENCOUNTER
Pt reports that the R might be a little worse than the L, but both are painful  Order placed for BLAYNE  AOIL order is already in chart  Call was transferred to scheduling

## 2021-05-28 NOTE — TELEPHONE ENCOUNTER
Pt w/ PAD- last BLAYNE was 9/14/20, which showed bilateral SFA proximal occlusions  Pt called the office today to report that over the past couple of weeks she has been having bilateral upper thigh pain/aching  She said she gets it when walking, especially up the steps  She also reports that sometimes it keeps her up at night  She denies any motor/sensory changes, temperature change, discoloration, or tissue loss  Informed her I would send a message to our triage provider and we will call her back

## 2021-06-03 ENCOUNTER — HOSPITAL ENCOUNTER (OUTPATIENT)
Dept: NON INVASIVE DIAGNOSTICS | Facility: CLINIC | Age: 76
Discharge: HOME | End: 2021-06-03
Payer: MEDICARE

## 2021-06-03 DIAGNOSIS — I70.213 ATHEROSCLEROSIS OF NATIVE ARTERY OF BOTH LOWER EXTREMITIES WITH INTERMITTENT CLAUDICATION (HCC): ICD-10-CM

## 2021-06-03 PROCEDURE — 93923 UPR/LXTR ART STDY 3+ LVLS: CPT

## 2021-06-03 PROCEDURE — 93925 LOWER EXTREMITY STUDY: CPT

## 2021-06-04 ENCOUNTER — HOSPITAL ENCOUNTER (OUTPATIENT)
Dept: NON INVASIVE DIAGNOSTICS | Facility: CLINIC | Age: 76
Discharge: HOME | End: 2021-06-04
Payer: MEDICARE

## 2021-06-04 DIAGNOSIS — I71.4 ABDOMINAL AORTIC ANEURYSM (AAA) WITHOUT RUPTURE (HCC): ICD-10-CM

## 2021-06-04 PROCEDURE — 93922 UPR/L XTREMITY ART 2 LEVELS: CPT | Performed by: SURGERY

## 2021-06-04 PROCEDURE — 93925 LOWER EXTREMITY STUDY: CPT | Performed by: SURGERY

## 2021-06-04 PROCEDURE — 93978 VASCULAR STUDY: CPT | Performed by: SURGERY

## 2021-06-04 PROCEDURE — 93978 VASCULAR STUDY: CPT

## 2021-06-09 ENCOUNTER — OFFICE VISIT (OUTPATIENT)
Dept: VASCULAR SURGERY | Facility: CLINIC | Age: 76
End: 2021-06-09
Payer: MEDICARE

## 2021-06-09 VITALS
HEART RATE: 72 BPM | DIASTOLIC BLOOD PRESSURE: 62 MMHG | HEIGHT: 64 IN | RESPIRATION RATE: 20 BRPM | WEIGHT: 140 LBS | BODY MASS INDEX: 23.9 KG/M2 | SYSTOLIC BLOOD PRESSURE: 136 MMHG

## 2021-06-09 DIAGNOSIS — I71.4 ABDOMINAL AORTIC ANEURYSM (AAA) WITHOUT RUPTURE (HCC): ICD-10-CM

## 2021-06-09 DIAGNOSIS — I70.213 ATHEROSCLEROSIS OF NATIVE ARTERY OF BOTH LOWER EXTREMITIES WITH INTERMITTENT CLAUDICATION (HCC): ICD-10-CM

## 2021-06-09 DIAGNOSIS — M54.50 BILATERAL LOW BACK PAIN, UNSPECIFIED CHRONICITY, UNSPECIFIED WHETHER SCIATICA PRESENT: Primary | ICD-10-CM

## 2021-06-09 PROCEDURE — 99214 OFFICE O/P EST MOD 30 MIN: CPT | Performed by: NURSE PRACTITIONER

## 2021-06-09 NOTE — ASSESSMENT & PLAN NOTE
Patient describes an unclear pain that moves from back, hips, knees  And overall fatigued feeling  Has been going on since December  Patient states in the last week has started to ease up some  She is using alternating Tylenol and ibuprofen for relief  States she has a hx of degenerative discs  Presents as more neurogenic/musculoskeletal in nature  Vascular studies unchanged since September 2020 and presenting symptoms not indicating claudication    Referral to pain management

## 2021-06-09 NOTE — ASSESSMENT & PLAN NOTE
-Stable  -AOIL 06/04/2021results known abdominal aortic aneurysm measuring 2 5 cm    This is unchanged from last study 09/14/2020  - patient denies abdominal pain or new back pain off her baseline     - Smoking cessation encouraged

## 2021-06-09 NOTE — ASSESSMENT & PLAN NOTE
Patient is a 70-year-old female smoker with history of PAD, CAD, hypertension, hyperlipidemia, AAA, and cervical stenosis who presents to the office today with c/o vague pain that travels from her back to hips to knees  This has been ongoing since December, but patient states in the last week has started to ease up  She describes a tired, "heaviness" in her thighs and an overall fatigue feeling  She is alternating ibuprofen and Tylenol to relieve the symptoms and states that that has worked  Patient had recent surveillance imaging moved up because of new complaints of pain in legs  BLAYNE 6/3/21   RIGHT-known occlusion of the proximal SFA with reconstitution of the proximal pop artery  GEN 0 64/80/63  LEFT- known occlusion of the proximal SFA with reconstitution of the distal SFA  GEN 0 49/59/43  This is unchanged from study done 09/14/2020      Plan:  -patient's symptoms are more consistent with neurogenic etiology  -referral to pain management  -continue aspirin and statin  -encouraged walking program as tolerated   -encouraged smoking cessation  -yearly surveillance on bilateral BLAYNE and AOIL  -follow up in office for new or worsening claudication symptoms

## 2021-06-09 NOTE — PROGRESS NOTES
Assessment/Plan:    Atherosclerosis of native artery of both lower extremities with intermittent claudication ([facility] [address] )  Patient is a 25-year-old female smoker with history of PAD, CAD, hypertension, hyperlipidemia, AAA, and cervical stenosis who presents to the office today with c/o vague pain that travels from her back to hips to knees  This has been ongoing since December, but patient states in the last week has started to ease up  She describes a tired, "heaviness" in her thighs and an overall fatigue feeling  She is alternating ibuprofen and Tylenol to relieve the symptoms and states that that has worked  Patient had recent surveillance imaging moved up because of new complaints of pain in legs  BLAYNE 6/3/21   RIGHT-known occlusion of the proximal SFA with reconstitution of the proximal pop artery  GEN 0 64/80/63  LEFT- known occlusion of the proximal SFA with reconstitution of the distal SFA  GEN 0 49/59/43  This is unchanged from study done 09/14/2020  Plan:  -patient's symptoms are more consistent with neurogenic etiology  -referral to pain management  -continue aspirin and statin  -encouraged walking program as tolerated   -encouraged smoking cessation  -yearly surveillance on bilateral BLAYNE and AOIL  -follow up in office for new or worsening claudication symptoms    Back pain  Patient describes an unclear pain that moves from back, hips, knees  And overall fatigued feeling  Has been going on since December  Patient states in the last week has started to ease up some  She is using alternating Tylenol and ibuprofen for relief  States she has a hx of degenerative discs  Presents as more neurogenic/musculoskeletal in nature  Vascular studies unchanged since September 2020 and presenting symptoms not indicating claudication    Referral to pain management      Abdominal aortic aneurysm (AAA) without rupture ([facility] [address] )  -Stable  -AOIL 06/04/2021results known abdominal aortic aneurysm measuring 2 5 cm   This is unchanged from last study 09/14/2020  - patient denies abdominal pain or new back pain off her baseline     - Smoking cessation encouraged      Nicotine dependence  -Current smoker   -Smoking 1/2pk /day (down from 3 packs)  -discussed the pathophysiology and relationship of smoking and vascular disease  -encourage smoking cessation    Mixed hyperlipidemia  -stable  -continue statin therapy  -management per PCP         Essential hypertension   BP well controlled 136/62  Known Rt renal prox stenosis 60-99% - unchanged        Diagnoses and all orders for this visit:    Bilateral low back pain, unspecified chronicity, unspecified whether sciatica present  -     Ambulatory referral to Pain Management; Future    Atherosclerosis of native artery of both lower extremities with intermittent claudication (HCC)  -     VAS lower limb arterial duplex, complete bilateral; Future    Abdominal aortic aneurysm (AAA) without rupture (HCC)  -     VAS abdominal aorta/iliacs; complete study; Future          Subjective:      Patient ID: Thao Dalal is a 76 y o  female  Patient had a  BLAYNE on 6/3/21 and AOIL on 6/4/21  Pt c/o back and thigh pain for the past 3 months  Pt states that the thigh pain woke her up at night  Pt has been using Biofreeze and Advil  Pt states that the back and thigh pain has been getting better  Pt is on ASA 81 mg and Atorvastatin  Pt continues to smoke 1/2 ppd  Patient is a 66-year-old female smoker with history of PAD, CAD, HTN, hyperlipidemia, AAA, cervical stenosis who presents today with pain that travels from her back to her thighs to her knees  This has been ongoing since December,  She uses alternating ibuprofen and Tylenol for symptom relief  She describes the pain as a tiered heaviness in her thighs and overall fatigued feeling in her legs  She does have history of degenerative discs      Because of the symptoms in her legs, it was recommended to have her BLAYNE and AOIL yearly surveillance moved up  Study results as noted above  There were no significant changes in comparison to imaging studies done 09/14/2020  Patient does not describe claudication like pain  She was able to walk the mall last week from the parking lot to [facility] without having to stop  She does note some fatigue after climbing the stairs multiple times  The symptoms she is presenting with at this time seem to be more neurogenic/musculoskeletal   We discussed at length PAD and claudication symptoms vs what she is feeling  Will put in a referral to Pain Management  Advised patient to call or return to the office with worsening or new symptoms  We will get her carotid duplex surveillance study done within the next few months and then can continue on yearly surveillance and follow-up  The following portions of the patient's history were reviewed and updated as appropriate: allergies, current medications, past family history, past medical history, past social history, past surgical history and problem list     Review of Systems   Constitutional: Negative  HENT: Negative  Eyes: Negative  Respiratory: Negative  Cardiovascular: Negative  Gastrointestinal: Negative  Endocrine: Negative  Genitourinary: Negative  Musculoskeletal: Positive for arthralgias, back pain and gait problem  Leg pain when walking   Skin: Negative  Allergic/Immunologic: Negative  Neurological: Negative for dizziness, weakness and numbness  Hematological: Negative  Psychiatric/Behavioral: Negative  I have reviewed and made appropriate changes to the review of systems input by the medical assistant  Objective:      /62 (BP Location: Left arm, Patient Position: Sitting)   Pulse 72   Resp 20   Ht 5' 4" (1 626 m)   Wt 63 5 kg (140 lb)   BMI 24 03 kg/m²          Physical Exam  Vitals signs reviewed  Constitutional:       Appearance: Normal appearance     HENT: Head: Normocephalic  Nose: Nose normal    Eyes:      Extraocular Movements: Extraocular movements intact  Neck:      Musculoskeletal: Normal range of motion and neck supple  Vascular: No carotid bruit  Cardiovascular:      Rate and Rhythm: Normal rate and regular rhythm  Pulses:           Femoral pulses are 2+ on the right side and 2+ on the left side  Popliteal pulses are 1+ on the right side and 1+ on the left side  Dorsalis pedis pulses are 1+ on the right side and 1+ on the left side  Posterior tibial pulses are detected w/ Doppler on the right side and detected w/ Doppler on the left side  Heart sounds: Normal heart sounds  Pulmonary:      Effort: Pulmonary effort is normal       Breath sounds: Normal breath sounds  Abdominal:      General: Abdomen is flat  Bowel sounds are normal       Palpations: Abdomen is soft  Musculoskeletal:      Right lower le+ Edema present  Left lower le+ Edema present  Comments: Trace ankle edema bilaterally     Skin:     General: Skin is warm and dry  Neurological:      General: No focal deficit present  Mental Status: She is alert and oriented to person, place, and time     Psychiatric:         Behavior: Behavior normal

## 2021-06-09 NOTE — PATIENT INSTRUCTIONS
Go for yearly carotid duplex study within the next few months  We will contact you with results  See pain management  Repeat abdominal and lower extremity arterial studies in 1 year with follow up    Return to office with new or worsening symptoms

## 2021-07-20 ENCOUNTER — CONSULT (OUTPATIENT)
Dept: PAIN MEDICINE | Facility: CLINIC | Age: 76
End: 2021-07-20
Payer: MEDICARE

## 2021-07-20 VITALS
HEART RATE: 62 BPM | HEIGHT: 64 IN | DIASTOLIC BLOOD PRESSURE: 72 MMHG | SYSTOLIC BLOOD PRESSURE: 134 MMHG | WEIGHT: 135 LBS | BODY MASS INDEX: 23.05 KG/M2

## 2021-07-20 DIAGNOSIS — I71.01 DISSECTION OF THORACIC AORTA (HCC): ICD-10-CM

## 2021-07-20 DIAGNOSIS — M51.36 LUMBAR DEGENERATIVE DISC DISEASE: ICD-10-CM

## 2021-07-20 DIAGNOSIS — N18.31 STAGE 3A CHRONIC KIDNEY DISEASE (HCC): ICD-10-CM

## 2021-07-20 DIAGNOSIS — M48.062 SPINAL STENOSIS OF LUMBAR REGION WITH NEUROGENIC CLAUDICATION: ICD-10-CM

## 2021-07-20 DIAGNOSIS — M54.50 BILATERAL LOW BACK PAIN, UNSPECIFIED CHRONICITY, UNSPECIFIED WHETHER SCIATICA PRESENT: ICD-10-CM

## 2021-07-20 DIAGNOSIS — G89.4 CHRONIC PAIN SYNDROME: Primary | ICD-10-CM

## 2021-07-20 PROBLEM — I71.019 DISSECTION OF THORACIC AORTA: Status: ACTIVE | Noted: 2021-07-20

## 2021-07-20 PROCEDURE — 99204 OFFICE O/P NEW MOD 45 MIN: CPT | Performed by: PHYSICAL MEDICINE & REHABILITATION

## 2021-07-20 NOTE — PROGRESS NOTES
Assessment  1  Bilateral low back pain, unspecified chronicity, unspecified whether sciatica present        Plan  Ms Iris Santos is a pleasant 54-year-old female who presents for initial evaluation regarding low back pain with radiating symptoms into bilateral lower extremities  During today's evaluation patient is demonstrating clinical and diagnostic evidence of lumbar spinal stenosis with neurogenic claudication with MRI evidence of multilevel severe degenerative disc disease, bilateral foraminal stenosis of varying levels of moderate to severe with mass effect  While she is not demonstrating any major red flags of bowel or bladder incontinence, weakness, pain out of proportion at night I do think it would be beneficial and warranted for her to at least establish care with surgery and for surgical consideration given the significant findings as identified on the MRI from 2020 and reported weakness with activity  At this time I will refer the patient to Dr Mathieu Mohan for his considerations and will gladly follow up with the patient afterwards if surgery is not advised  All questions answered, patient is agreeable to plan  My impressions and treatment recommendations were discussed in detail with the patient who verbalized understanding and had no further questions  Discharge instructions were provided  I personally saw and examined the patient and I agree with the above discussed plan of care  No orders of the defined types were placed in this encounter  No orders of the defined types were placed in this encounter  History of Present Illness    Bethel Dickerson is a 76 y o  female [facility] for initial evaluation regarding 1 years duration of low back pain with radiating symptoms into bilateral lateral and anterolateral thighs  Patient denies any significant inciting event or recent trauma    Today reports moderate to severe pain rated 7/10 and interfering with daily activities  Pain is nearly constant 60-95% of the time that is present in the morning and evening  Describes symptoms as burning, pressure-like, throbbing  Also reports lower extremity weakness but denies falls  Does not use any durable medical equipment for ambulation  Symptoms are worse with lying down, standing, bending, sitting, walking, exercise, relaxation  She has had moderate relief with chiropractic manipulation and exercise  She reports excellent relief with heat and ice  Admits to smoking half a pack per day for 59 years  Currently taking over-the-counter NSAIDs including aspirin with some relief  Presents today for initial evaluation  Does have an MRI of the lumbar spine from [facility] and 2020  I have personally reviewed and/or updated the patient's past medical history, past surgical history, family history, social history, current medications, allergies, and vital signs today  Review of Systems   Constitutional: Negative for fever and unexpected weight change  HENT: Negative for trouble swallowing  Eyes: Negative for visual disturbance  Respiratory: Negative for shortness of breath and wheezing  Cardiovascular: Negative for chest pain and palpitations  Gastrointestinal: Negative for constipation, diarrhea, nausea and vomiting  Endocrine: Negative for cold intolerance, heat intolerance and polydipsia  Genitourinary: Negative for difficulty urinating and frequency  Musculoskeletal: Positive for back pain and gait problem  Negative for arthralgias, joint swelling and myalgias  Skin: Negative for rash  Neurological: Positive for weakness  Negative for dizziness, seizures, syncope and headaches  Hematological: Does not bruise/bleed easily  Psychiatric/Behavioral: Negative for dysphoric mood  All other systems reviewed and are negative        Patient Active Problem List   Diagnosis    Abdominal aortic aneurysm (AAA) without rupture ([facility] [address] )    Atherosclerosis of native artery of both lower extremities with intermittent claudication (HCC)    Back pain    Bradycardia    Carotid artery stenosis    Cervical spinal stenosis    Diverticulosis of large intestine without hemorrhage    Essential hypertension    History of colonic polyps    Mixed hyperlipidemia    Nicotine dependence    Diverticulosis of intestine without bleeding    Hx of colonic polyps       Past Medical History:   Diagnosis Date    AAA (abdominal aortic aneurysm) (HCC)     2 4 cm     History of colon polyps     Hyperlipidemia     Hypertension     PVD (peripheral vascular disease) ([address] )     femoral blockages       Past Surgical History:   Procedure Laterality Date    BACK SURGERY      herniated disc    COLONOSCOPY      TN COLONOSCOPY FLX DX W/COLLJ SPEC WHEN PFRMD N/A 12/3/2018    Procedure: COLONOSCOPY;  Surgeon: Kia Barillas MD;  Location: [facility];   Service: Colorectal       Family History   Problem Relation Age of Onset    Congenital heart disease Mother     Lung cancer Father     Alcohol abuse Father     Endometrial cancer Sister 46    Ovarian cancer Maternal Aunt 48    No Known Problems Daughter     No Known Problems Maternal Grandmother     No Known Problems Maternal Grandfather     No Known Problems Paternal Grandmother     No Known Problems Paternal Grandfather     No Known Problems Sister     No Known Problems Sister     Skin cancer Brother        Social History     Occupational History    Not on file   Tobacco Use    Smoking status: Current Every Day Smoker     Packs/day: 0 50    Smokeless tobacco: Never Used    Tobacco comment: last cigarette 3 weeks ago trying to quit   Vaping Use    Vaping Use: Never used   Substance and Sexual Activity    Alcohol use: Yes     Comment: wine daily    Drug use: No    Sexual activity: Not on file       Current Outpatient Medications on File Prior to Visit   Medication Sig    ALPRAZolam (XANAX) 0 25 mg tablet     amLODIPine (NORVASC) 5 mg tablet     aspirin 81 MG tablet Take 1 tablet by mouth daily    atorvastatin (LIPITOR) 40 mg tablet Take 40 mg by mouth    Calcium 250 MG CAPS Take by mouth    cholecalciferol (VITAMIN D3) 1,000 units tablet Take 1,000 Units by mouth    Flaxseed Oil OIL Take 1 tablet by mouth    hydrochlorothiazide (HYDRODIURIL) 12 5 mg tablet Take 12 5 mg by mouth daily    losartan (COZAAR) 100 MG tablet Take 100 mg by mouth daily    losartan-hydrochlorothiazide (HYZAAR) 100-12 5 MG per tablet     MAGNESIUM PO Take 1 tablet by mouth every other day    metoprolol succinate (TOPROL-XL) 100 mg 24 hr tablet     Multiple Vitamins-Minerals (MULTIVITAMIN ADULT PO) Take 1 capsule by mouth    Thiamine HCl (VITAMIN B-1 PO) Take 1 tablet by mouth     No current facility-administered medications on file prior to visit  Allergies   Allergen Reactions    Codeine Vomiting       Physical Exam    /72   Pulse 62   Ht 5' 4" (1 626 m)   Wt 61 2 kg (135 lb)   BMI 23 17 kg/m²     Constitutional: normal, well developed, well nourished, alert, in no distress and non-toxic and no overt pain behavior    Eyes: anicteric  HEENT: grossly intact  Neck: supple, symmetric, trachea midline and no masses   Pulmonary:even and unlabored  Cardiovascular:No edema or pitting edema present  Skin:Normal without rashes or lesions and well hydrated  Psychiatric:Mood and affect appropriate  Neurologic:Cranial Nerves II-XII grossly intact  Musculoskeletal:normal gait, tenderness to palpation bilateral lumbar paraspinals, decreased active and passive range of motion lumbar flexion extension limited by pain, MMT 5/5 bilateral lower extremities, sensation grossly intact to light touch, straight leg raise negative and the supine position for radicular pain in bilateral lower extremities, DTRs hyperreflexic bilateral patellar reflexes and normal achilles reflexes    Imaging

## 2021-07-20 NOTE — PATIENT INSTRUCTIONS
Lumbar Spinal Stenosis   WHAT YOU NEED TO KNOW:   Lumbar spinal stenosis is narrowing of the spinal canal in your lower back  Your spinal canal holds your spinal cord  The spinal cord controls your ability to move  When your spinal canal narrows, it may put pressure on your spinal cord  DISCHARGE INSTRUCTIONS:   Return to the emergency department if:   · You have pain in your leg that does not go away or gets worse  · You have trouble moving your legs  · You cannot control when you urinate or have a bowel movement  Contact your healthcare provider if:   · You have new or worsening symptoms  · Your symptoms keep you from doing your daily activities  · You have questions or concerns about your condition or care  Medicines:   · NSAIDs , such as ibuprofen, help decrease swelling, pain, and fever  NSAIDs can cause stomach bleeding or kidney problems in certain people  If you take blood thinner medicine, always ask your healthcare provider if NSAIDs are safe for you  Always read the medicine label and follow directions  · Acetaminophen  decreases pain and fever  It is available without a doctor's order  Ask how much to take and how often to take it  Follow directions  Read the labels of all other medicines you are using to see if they also contain acetaminophen, or ask your doctor or pharmacist  Acetaminophen can cause liver damage if not taken correctly  Do not use more than 4 grams (4,000 milligrams) total of acetaminophen in one day  · Prescription pain medicine  may be given  Ask how to take this medicine safely  · Muscle relaxers  help decrease pain and muscle spasms  · Take your medicine as directed  Contact your healthcare provider if you think your medicine is not helping or if you have side effects  Tell him or her if you are allergic to any medicine  Keep a list of the medicines, vitamins, and herbs you take  Include the amounts, and when and why you take them   Bring the list or the pill bottles to follow-up visits  Carry your medicine list with you in case of an emergency  Self-care:   · Rest  when you feel it is needed  Slowly start to do more each day  Return to your daily activities as directed  · Apply heat on your back for 20 to 30 minutes every 2 hours for as many days as directed  Heat helps decrease pain and muscle spasms  · Apply ice  on your back for 15 to 20 minutes every hour or as directed  Use an ice pack, or put crushed ice in a plastic bag  Cover it with a towel before you apply it to your skin  Ice helps prevent tissue damage and decreases swelling and pain  Go to physical and occupational therapy as directed:  A physical therapist teaches you exercises to help improve movement and strength, and to decrease pain  An occupational therapist teaches you skills to help with your daily activities  Follow up with your healthcare provider as directed:  Write down your questions so you remember to ask them during your visits  © Copyright [employer] 2021 Information is for End User's use only and may not be sold, redistributed or otherwise used for commercial purposes  All illustrations and images included in CareNotes® are the copyrighted property of [employer]  or [facility] Carly Grimaldo   The above information is an  only  It is not intended as medical advice for individual conditions or treatments  Talk to your doctor, nurse or pharmacist before following any medical regimen to see if it is safe and effective for you

## 2021-08-30 ENCOUNTER — OFFICE VISIT (OUTPATIENT)
Dept: URGENT CARE | Facility: MEDICAL CENTER | Age: 76
End: 2021-08-30
Payer: MEDICARE

## 2021-08-30 VITALS
DIASTOLIC BLOOD PRESSURE: 63 MMHG | BODY MASS INDEX: 22.9 KG/M2 | WEIGHT: 134.13 LBS | TEMPERATURE: 98.6 F | HEART RATE: 63 BPM | HEIGHT: 64 IN | SYSTOLIC BLOOD PRESSURE: 138 MMHG | RESPIRATION RATE: 18 BRPM

## 2021-08-30 DIAGNOSIS — H61.21 IMPACTED CERUMEN OF RIGHT EAR: Primary | ICD-10-CM

## 2021-08-30 PROCEDURE — G0463 HOSPITAL OUTPT CLINIC VISIT: HCPCS | Performed by: PHYSICIAN ASSISTANT

## 2021-08-30 PROCEDURE — 99212 OFFICE O/P EST SF 10 MIN: CPT | Performed by: PHYSICIAN ASSISTANT

## 2021-08-30 PROCEDURE — 69210 REMOVE IMPACTED EAR WAX UNI: CPT | Performed by: PHYSICIAN ASSISTANT

## 2021-08-30 NOTE — PROGRESS NOTES
[facility]        NAME: Bethel Dickerson is a 76 y o  female  : 1945    MRN: [id number]  DATE: 2021  TIME: 12:43 PM    Assessment and Plan   Impacted cerumen of right ear [H61 21]  1  Impacted cerumen of right ear           Patient Instructions     Patient Instructions     Cerumen Impaction   WHAT YOU NEED TO KNOW:   Cerumen impaction is the blockage of the outer ear canal by tightly packed cerumen (earwax)  It is generally treated with procedures such as flushing or suctioning the ear canal or the use of instruments to remove the impaction  DISCHARGE INSTRUCTIONS:   Medicines:  · Ear drops: These are used to soften the wax in your ear  Wax softening ear drops may be bought without a prescription  Ask your healthcare provider how often you should use this medicine  Read the instructions carefully before you use the ear drops  Do the following when you put in ear drops:     ? Warm the drops by holding the bottle in your hands for a few minutes  Cold ear drops may make you dizzy  ? Lie down with the affected ear toward the ceiling  You may also stand with your head tilted to one side  ? Pull your ear lobe up and back, and place the correct number of drops into the ear  ? Keep your ear facing up for 5 to 10 minutes so the drops coat the outer ear canal      ? Gently clean the outer part of the ear with a cotton swab  Do not  place the cotton swab or anything inside your ear canal  This increases the risk of damaging your eardrum  · Take your medicine as directed  Contact your healthcare provider if you think your medicine is not helping or if you have side effects  Tell him of her if you are allergic to any medicine  Keep a list of the medicines, vitamins, and herbs you take  Include the amounts, and when and why you take them  Bring the list or the pill bottles to follow-up visits  Carry your medicine list with you in case of an emergency      Follow up with your healthcare provider as directed:  Write down your questions so you remember to ask them during your visits  Contact your healthcare provider if:   · You have a fever  · You have trouble hearing or ringing in your ear  · You have questions about your condition or care  Return to the emergency department if:   · You feel dizzy  · You have discharge or blood coming out of your ear  · Your ear pain does not go away or gets worse  © Copyright [employer] 2018 Information is for End User's use only and may not be sold, redistributed or otherwise used for commercial purposes  All illustrations and images included in CareNotes® are the copyrighted property of A D A M , Inc  or [employer]   The above information is an  only  It is not intended as medical advice for individual conditions or treatments  Talk to your doctor, nurse or pharmacist before following any medical regimen to see if it is safe and effective for you  Follow up with PCP in 3-5 days  Proceed to  ER if symptoms worsen  Chief Complaint     Chief Complaint   Patient presents with    Earache     rigth ear "clogged " x 2days         History of Present Illness       42-year-old female presents with right ear feeling clogged for last several days  No ear pain or drainage  No trauma to the ear  No cough or runny nose  She tried using Q-tips but was unable to get the wax out  Review of Systems   Review of Systems   Constitutional: Negative  HENT: Positive for hearing loss  Eyes: Negative  Respiratory: Negative  Cardiovascular: Negative  Gastrointestinal: Negative            Current Medications       Current Outpatient Medications:     ALPRAZolam (XANAX) 0 25 mg tablet, , Disp: , Rfl:     amLODIPine (NORVASC) 5 mg tablet, , Disp: , Rfl:     aspirin 81 MG tablet, Take 1 tablet by mouth daily, Disp: , Rfl:     Calcium 250 MG CAPS, Take by mouth, Disp: , Rfl:     cholecalciferol (VITAMIN D3) 1,000 units tablet, Take 1,000 Units by mouth, Disp: , Rfl:     Flaxseed Oil OIL, Take 1 tablet by mouth, Disp: , Rfl:     hydrochlorothiazide (HYDRODIURIL) 12 5 mg tablet, Take 12 5 mg by mouth daily, Disp: , Rfl: 1    losartan (COZAAR) 100 MG tablet, Take 100 mg by mouth daily, Disp: , Rfl: 1    losartan-hydrochlorothiazide (HYZAAR) 100-12 5 MG per tablet, , Disp: , Rfl:     MAGNESIUM PO, Take 1 tablet by mouth every other day, Disp: , Rfl:     metoprolol succinate (TOPROL-XL) 100 mg 24 hr tablet, , Disp: , Rfl:     Multiple Vitamins-Minerals (MULTIVITAMIN ADULT PO), Take 1 capsule by mouth, Disp: , Rfl:     Thiamine HCl (VITAMIN B-1 PO), Take 1 tablet by mouth, Disp: , Rfl:     atorvastatin (LIPITOR) 40 mg tablet, Take 40 mg by mouth, Disp: , Rfl:     Current Allergies     Allergies as of 08/30/2021 - Reviewed 08/30/2021   Allergen Reaction Noted    Codeine Vomiting 05/01/2017            The following portions of the patient's history were reviewed and updated as appropriate: allergies, current medications, past family history, past medical history, past social history, past surgical history and problem list      Past Medical History:   Diagnosis Date    AAA (abdominal aortic aneurysm) (HCC)     2 4 cm     History of colon polyps     Hyperlipidemia     Hypertension     PVD (peripheral vascular disease) ([facility] [address] )     femoral blockages       Past Surgical History:   Procedure Laterality Date    BACK SURGERY      herniated disc    COLONOSCOPY      OK COLONOSCOPY FLX DX W/COLLJ SPEC WHEN PFRMD N/A 12/3/2018    Procedure: COLONOSCOPY;  Surgeon: Robbin Wilcox MD;  Location: [facility];   Service: Colorectal       Family History   Problem Relation Age of Onset    Congenital heart disease Mother     Lung cancer Father     Alcohol abuse Father     Endometrial cancer Sister 46    Ovarian cancer Maternal Aunt 48    No Known Problems Daughter     No Known Problems Maternal Grandmother  No Known Problems Maternal Grandfather     No Known Problems Paternal Grandmother     No Known Problems Paternal Grandfather     No Known Problems Sister     No Known Problems Sister     Skin cancer Brother          Medications have been verified  Objective   /63   Pulse 63   Temp 98 6 °F (37 °C)   Resp 18   Ht 5' 4" (1 626 m)   Wt 60 8 kg (134 lb 2 oz)   BMI 23 02 kg/m²          Physical Exam     Physical Exam  Constitutional:       General: She is not in acute distress  Appearance: Normal appearance  She is well-developed  HENT:      Right Ear: Tympanic membrane and external ear normal  There is impacted cerumen  Left Ear: Hearing, tympanic membrane and ear canal normal       Ears:      Comments: Right ear canal impacted with cerumen  This was removed with lavage  Symptoms resolved  After cerumen removal canal was normal and TM normal   Cardiovascular:      Rate and Rhythm: Normal rate  Pulmonary:      Effort: Pulmonary effort is normal    Neurological:      Mental Status: She is alert and oriented to person, place, and time  Ear cerumen removal    Date/Time: 8/30/2021 12:43 PM  Performed by: Eliberto Klinefelter, PA-C  Authorized by: Eliberto Klinefelter, PA-C   Universal Protocol:  Consent: Verbal consent obtained  Consent given by: patient  Patient identity confirmed: verbally with patient      Patient location:  Clinic  Procedure details:     Location:  R ear    Procedure type: irrigation with instrumentation      Instrumentation: curette    Post-procedure details:     Complication:  None    Hearing quality:  Normal    Patient tolerance of procedure:   Tolerated well, no immediate complications

## 2021-08-30 NOTE — PATIENT INSTRUCTIONS
Cerumen Impaction   WHAT YOU NEED TO KNOW:   Cerumen impaction is the blockage of the outer ear canal by tightly packed cerumen (earwax)  It is generally treated with procedures such as flushing or suctioning the ear canal or the use of instruments to remove the impaction  DISCHARGE INSTRUCTIONS:   Medicines:  · Ear drops: These are used to soften the wax in your ear  Wax softening ear drops may be bought without a prescription  Ask your healthcare provider how often you should use this medicine  Read the instructions carefully before you use the ear drops  Do the following when you put in ear drops:     ? Warm the drops by holding the bottle in your hands for a few minutes  Cold ear drops may make you dizzy  ? Lie down with the affected ear toward the ceiling  You may also stand with your head tilted to one side  ? Pull your ear lobe up and back, and place the correct number of drops into the ear  ? Keep your ear facing up for 5 to 10 minutes so the drops coat the outer ear canal      ? Gently clean the outer part of the ear with a cotton swab  Do not  place the cotton swab or anything inside your ear canal  This increases the risk of damaging your eardrum  · Take your medicine as directed  Contact your healthcare provider if you think your medicine is not helping or if you have side effects  Tell him of her if you are allergic to any medicine  Keep a list of the medicines, vitamins, and herbs you take  Include the amounts, and when and why you take them  Bring the list or the pill bottles to follow-up visits  Carry your medicine list with you in case of an emergency  Follow up with your healthcare provider as directed:  Write down your questions so you remember to ask them during your visits  Contact your healthcare provider if:   · You have a fever  · You have trouble hearing or ringing in your ear  · You have questions about your condition or care      Return to the emergency department if:   · You feel dizzy  · You have discharge or blood coming out of your ear  · Your ear pain does not go away or gets worse  © Copyright [employer] 2018 Information is for End User's use only and may not be sold, redistributed or otherwise used for commercial purposes  All illustrations and images included in CareNotes® are the copyrighted property of A D A M , Inc  or [facility] Carly Grimaldo   The above information is an  only  It is not intended as medical advice for individual conditions or treatments  Talk to your doctor, nurse or pharmacist before following any medical regimen to see if it is safe and effective for you

## 2021-12-13 ENCOUNTER — TELEPHONE (OUTPATIENT)
Dept: PAIN MEDICINE | Facility: CLINIC | Age: 76
End: 2021-12-13

## 2021-12-13 ENCOUNTER — TELEPHONE (OUTPATIENT)
Dept: OBGYN CLINIC | Facility: HOSPITAL | Age: 76
End: 2021-12-13

## 2021-12-27 ENCOUNTER — APPOINTMENT (OUTPATIENT)
Dept: RADIOLOGY | Age: 76
End: 2021-12-27
Payer: MEDICARE

## 2021-12-27 DIAGNOSIS — M51.36 DEGENERATIVE DISC DISEASE, LUMBAR: ICD-10-CM

## 2021-12-27 PROCEDURE — 72110 X-RAY EXAM L-2 SPINE 4/>VWS: CPT

## 2022-03-28 ENCOUNTER — EVALUATION (OUTPATIENT)
Dept: PHYSICAL THERAPY | Age: 77
End: 2022-03-28
Payer: MEDICARE

## 2022-03-28 DIAGNOSIS — G89.29 OTHER CHRONIC PAIN: Primary | ICD-10-CM

## 2022-03-28 PROCEDURE — 97161 PT EVAL LOW COMPLEX 20 MIN: CPT | Performed by: PHYSICAL THERAPIST

## 2022-03-28 NOTE — PROGRESS NOTES
PT Evaluation     Today's date: 3/28/2022  Patient name: Emma Ye  : 1945  MRN: [id number]  Referring provider: Rogerio Martin DO  Dx:   Encounter Diagnosis     ICD-10-CM    1  Other chronic pain  G89 29                   Assessment  Assessment details: Pt reports to PT with cc of lumbar/SI pain that is chronic in nature but that increased since 10/21  Pain decreased following injections, per pt  Pt has decreased LE strength and pain-free lumbar ROM  Pt would benefit from skilled PT, beginning with aquatic PT in order to decrease difficulty with ADLs     Impairments: abnormal coordination, abnormal gait, abnormal muscle firing, abnormal muscle tone, abnormal or restricted ROM, abnormal movement, activity intolerance, impaired physical strength, lacks appropriate home exercise program, pain with function and poor body mechanics    Goals  In 4 weeks pt will:  -Be independent with phase I of HEP  -Increase LE strength by 1/2 grade  -Increase Lumber ROM by 25%    By discharge pt will:  -Be independent with Phase II of HEP  -Demonstrate full LE strength  -Demonstrate full Lumbar ROM  -Report minimal pain with ADLs    Plan  Patient would benefit from: skilled physical therapy  Planned therapy interventions: abdominal trunk stabilization, joint mobilization, manual therapy, muscle pump exercises, neuromuscular re-education, patient education, strengthening, stretching, therapeutic activities, therapeutic exercise, functional ROM exercises and home exercise program  Frequency: 2x week  Duration in weeks: 6  Plan of Care beginning date: 3/28/2022  Plan of Care expiration date: 2022  Treatment plan discussed with: patient and PTA        Subjective    Objective     Active Range of Motion     Additional Active Range of Motion Details  Lumbar ROM as % of normal ROM    Flex:75  Ext:75  R ROT:75  L ROT:75      Strength/Myotome Testing     Left Hip   Planes of Motion   Abduction: 3+    Right Hip   Planes of Motion   Abduction: 3+    Left Knee   Flexion: 3+  Extension: 4    Right Knee   Flexion: 4  Extension: 4    Left Ankle/Foot   Dorsiflexion: 4  Plantar flexion: 4    Right Ankle/Foot   Dorsiflexion: 4  Plantar flexion: 4    Tests     Lumbar     Left   Negative slump test      Right   Negative slump test               Precautions: AAA, CKD      Manuals                                                                 Neuro Re-Ed                                                                                                        Ther Ex Pool            Walking laps             HS/piriformis stretch             Std hip abd/flex/ext             Turtle flexion             Ball press down             Heel raise/toe raise             Side step             Bike-floats                                                                 Ther Activity                                       Gait Training                                       Modalities

## 2022-04-04 ENCOUNTER — OFFICE VISIT (OUTPATIENT)
Dept: PHYSICAL THERAPY | Age: 77
End: 2022-04-04
Payer: MEDICARE

## 2022-04-04 DIAGNOSIS — G89.29 OTHER CHRONIC PAIN: Primary | ICD-10-CM

## 2022-04-04 PROCEDURE — 97113 AQUATIC THERAPY/EXERCISES: CPT | Performed by: PHYSICAL THERAPIST

## 2022-04-04 NOTE — PROGRESS NOTES
Daily Note     Today's date: 2022  Patient name: Beverly Muro  : 1945  MRN: [id number]  Referring provider: Delonte Tracy DO  Dx: No diagnosis found  Subjective: The patient returns after IE reporting no pain or issues after last appointment  She notes her primary complaint is continued tightness throughout lumbar spine  Objective: See treatment diary below      Assessment: The patient tolerated initiation of TE program well without pain  She noted significant improvement in tightness while performing therapy specifically the turtle flexion stretch  Plan: Continue per plan of care  Assess response to treatment NV        Precautions: AAA, CKD      Manuals                                                                 Neuro Re-Ed                                                                                                        Ther Ex Pool Pool           Walking laps  5x           HS/piriformis stretch  30"x3 ea           Std hip abd/flex/ext  1x20 ea           Turtle flexion  1x10           Ball press down  5"x20           Heel raise/toe raise  1x30 ea           Side step  4x           Bike-floats  5 min                                                               Ther Activity                                       Gait Training                                       Modalities

## 2022-04-07 ENCOUNTER — OFFICE VISIT (OUTPATIENT)
Dept: PHYSICAL THERAPY | Age: 77
End: 2022-04-07
Payer: MEDICARE

## 2022-04-07 DIAGNOSIS — G89.29 OTHER CHRONIC PAIN: Primary | ICD-10-CM

## 2022-04-07 PROCEDURE — 97113 AQUATIC THERAPY/EXERCISES: CPT | Performed by: PHYSICAL THERAPIST

## 2022-04-07 NOTE — PROGRESS NOTES
Daily Note     Today's date: 2022  Patient name: Liya Saxena  : 1945  MRN: [id number]  Referring provider: Korina Aranda DO  Dx:   Encounter Diagnosis     ICD-10-CM    1  Other chronic pain  G89 29                   Subjective: pt has no complaints      Objective: See treatment diary below      Assessment: Tolerated treatment well  Patient demonstrated fatigue post treatment and would benefit from continued PT      Plan: Continue per plan of care  Progress treatment as tolerated         Precautions: AAA, CKD      Manuals                                                                Neuro Re-Ed                                                                                                        Ther Ex Pool Pool           Walking laps  5x 5x          HS/piriformis stretch  30"x3 ea 30"x3 ea          Std hip abd/flex/ext  1x20 ea x30           Turtle flexion  1x10 x10 L/R/C          Ball press down  5"x20 5"x20          Heel raise/toe raise  1x30 ea x30           Side step  4x 5x          Bike-floats  5 min 5'          Step ups   2x10 ea                                                 Ther Activity                                       Gait Training                                       Modalities

## 2022-04-11 ENCOUNTER — OFFICE VISIT (OUTPATIENT)
Dept: PHYSICAL THERAPY | Age: 77
End: 2022-04-11
Payer: MEDICARE

## 2022-04-11 DIAGNOSIS — G89.29 OTHER CHRONIC PAIN: Primary | ICD-10-CM

## 2022-04-11 PROCEDURE — 97113 AQUATIC THERAPY/EXERCISES: CPT | Performed by: PHYSICAL THERAPIST

## 2022-04-11 NOTE — PROGRESS NOTES
Daily Note     Today's date: 2022  Patient name: Dejah Connelly  : 1945  MRN: [id number]  Referring provider: Francesca Duong DO  Dx:   Encounter Diagnosis     ICD-10-CM    1  Other chronic pain  G89 29                   Subjective: Pt has no complaints       Objective: See treatment diary below      Assessment: Tolerated treatment well  Patient demonstrated fatigue post treatment and would benefit from continued PT      Plan: Continue per plan of care  Progress treatment as tolerated         Precautions: AAA, CKD      Manuals                                                               Neuro Re-Ed                                                                                                        Ther Ex Pool Pool           Walking laps  5x 5x 5x         HS/piriformis stretch  30"x3 ea 30"x3 ea 30"x3 ea         Std hip abd/flex/ext  1x20 ea x30  x30          Turtle flexion  1x10 x10 L/R/C x10 L/R/C         Ball press down  5"x20 5"x20 5"x20         Heel raise/toe raise  1x30 ea x30  x30          Side step  4x 5x 5x         Bike-floats  5 min 5' 5'         Step ups   2x10 ea 2x10 ea                                                Ther Activity                                       Gait Training                                       Modalities

## 2022-04-14 ENCOUNTER — OFFICE VISIT (OUTPATIENT)
Dept: PHYSICAL THERAPY | Age: 77
End: 2022-04-14
Payer: MEDICARE

## 2022-04-14 DIAGNOSIS — G89.29 OTHER CHRONIC PAIN: Primary | ICD-10-CM

## 2022-04-14 PROCEDURE — 97113 AQUATIC THERAPY/EXERCISES: CPT | Performed by: SPECIALIST/TECHNOLOGIST

## 2022-04-14 NOTE — PROGRESS NOTES
Daily Note     Today's date: 2022  Patient name: Beverly Muro  : 1945  MRN: [id number]  Referring provider: Delonte Tracy DO  Dx:   Encounter Diagnosis     ICD-10-CM    1  Other chronic pain  G89 29                   Subjective: Pt reports B hip stiffness and intermittent LLE discomfort  Objective: See treatment diary below    Assessment: Pt demonstrates appropriate levels of muscular fatigue and good target muscle activation with increased reps this visit  Tolerated treatment well  Patient demonstrated fatigue post treatment, exhibited good technique with therapeutic exercises and would benefit from continued PT    Plan: Continue per plan of care  Progress treatment as tolerated         Precautions: AAA, CKD      Manuals                                                              Neuro Re-Ed                                                                                                        Ther Ex Pool Pool           Walking laps  5x 5x 5x 5x        HS/piriformis stretch  30"x3 ea 30"x3 ea 30"x3 ea 30x3s        Std hip abd/flex/ext  1x20 ea x30  x30  x30 ea        Turtle flexion  1x10 x10 L/R/C x10 L/R/C x10 L/R/C        Ball press down  5"x20 5"x20 5"x20 5" x15 L/R/C          Heel raise/toe raise  1x30 ea x30  x30  x30        Side step  4x 5x 5x 10x        Bike-floats  5 min 5' 5' 5'        DKTC w B UE Floats     30x5s        Step ups   2x10 ea 2x10 ea 30x ea                                               Ther Activity                                       Gait Training                                       Modalities

## 2022-04-18 ENCOUNTER — OFFICE VISIT (OUTPATIENT)
Dept: PHYSICAL THERAPY | Age: 77
End: 2022-04-18
Payer: MEDICARE

## 2022-04-18 DIAGNOSIS — G89.29 OTHER CHRONIC PAIN: Primary | ICD-10-CM

## 2022-04-18 PROCEDURE — 97113 AQUATIC THERAPY/EXERCISES: CPT | Performed by: SPECIALIST/TECHNOLOGIST

## 2022-04-18 NOTE — PROGRESS NOTES
Daily Note     Today's date: 2022  Patient name: Soha Pablo  : 1945  MRN: [id number]  Referring provider: Bossman Mendoza DO  Dx:   Encounter Diagnosis     ICD-10-CM    1  Other chronic pain  G89 29                   Subjective: Pt reports she felt good following previous tx session with increased reps  Objective: See treatment diary below    Assessment: Mild muscular fatigue noted with assigned therex, pt may tolerate added cuff wt resistance next visit  Tolerated treatment well  Patient demonstrated fatigue post treatment, exhibited good technique with therapeutic exercises and would benefit from continued PT    Plan: Continue per plan of care  Progress treatment as tolerated            Precautions: AAA, CKD    Manuals   ROYAL BLUE CUFFS                                                          Neuro Re-Ed                                                                                                        Ther Ex [employer] laps  5x 5x 5x 5x 5x       HS/piriformis stretch  30"x3 ea 30"x3 ea 30"x3 ea 30x3s 3x30s       Std hip abd/flex/ext  1x20 ea x30  x30  x30 ea x30 ea       Turtle flexion  1x10 x10 L/R/C x10 L/R/C x10 L/R/C x10 L/R/C       Ball press down  5"x20 5"x20 5"x20 5" x15 L/R/C 5" x15 L/R/C       Heel raise/toe raise  1x30 ea x30  x30  x30 x30       Side step  4x 5x 5x 10x 10'       Bike-floats  5 min 5' 5' 5' 5'       DKTC w B UE Floats     30x5s 30x5s       Step ups   2x10 ea 2x10 ea 30x ea 30x ea                                              Ther Activity                                       Gait Training                                       Modalities

## 2022-04-21 ENCOUNTER — OFFICE VISIT (OUTPATIENT)
Dept: PHYSICAL THERAPY | Age: 77
End: 2022-04-21
Payer: MEDICARE

## 2022-04-21 DIAGNOSIS — G89.29 OTHER CHRONIC PAIN: Primary | ICD-10-CM

## 2022-04-21 PROCEDURE — 97113 AQUATIC THERAPY/EXERCISES: CPT | Performed by: SPECIALIST/TECHNOLOGIST

## 2022-04-21 NOTE — PROGRESS NOTES
Daily Note     Today's date: 2022  Patient name: Wilman Coleman  : 1945  MRN: [id number]  Referring provider: Petrona Sierra DO  Dx:   Encounter Diagnosis     ICD-10-CM    1  Other chronic pain  G89 29                   Subjective: Pt reports she pinched a muscle in the L side of her back while dusting  Objective: See treatment diary below    Assessment: No progressions to POC this visit given pt's reported sx  Tolerated treatment well  Patient demonstrated fatigue post treatment, exhibited good technique with therapeutic exercises and would benefit from continued PT    Plan: Continue per plan of care  Progress treatment as tolerated         Precautions: AAA, CKD    Manuals                                                            Neuro Re-Ed                                                                                                        Ther Ex Pool Pool     ROYAL BLUE CUFFS      Walking laps  5x 5x 5x 5x 5x 5x      HS/piriformis stretch  30"x3 ea 30"x3 ea 30"x3 ea 30x3s 3x30s 3x30s      Std hip abd/flex/ext  1x20 ea x30  x30  x30 ea x30 ea x30 ea      Turtle flexion  1x10 x10 L/R/C x10 L/R/C x10 L/R/C x10 L/R/C x10 L/R/C      Ball press down  5"x20 5"x20 5"x20 5" x15 L/R/C 5" x15 L/R/C 5" x15 L/R/C      Heel raise/toe raise  1x30 ea x30  x30  x30 x30 x30      Side step  4x 5x 5x 10x 10' 10x      Bike-floats  5 min 5' 5' 5' 5' 5'      DKTC w B UE Floats     30x5s 30x5s 30x5s      Step ups   2x10 ea 2x10 ea 30x ea 30x ea 30x                                              Ther Activity                                       Gait Training                                       Modalities

## 2022-04-25 ENCOUNTER — APPOINTMENT (OUTPATIENT)
Dept: PHYSICAL THERAPY | Age: 77
End: 2022-04-25
Payer: MEDICARE

## 2022-04-28 ENCOUNTER — APPOINTMENT (OUTPATIENT)
Dept: PHYSICAL THERAPY | Age: 77
End: 2022-04-28
Payer: MEDICARE

## 2022-05-02 ENCOUNTER — OFFICE VISIT (OUTPATIENT)
Dept: PHYSICAL THERAPY | Age: 77
End: 2022-05-02
Payer: MEDICARE

## 2022-05-02 DIAGNOSIS — G89.29 OTHER CHRONIC PAIN: Primary | ICD-10-CM

## 2022-05-02 PROCEDURE — 97113 AQUATIC THERAPY/EXERCISES: CPT | Performed by: PHYSICAL THERAPIST

## 2022-05-02 NOTE — PROGRESS NOTES
Daily Note     Today's date: 2022  Patient name: Mac Carias  : 1945  MRN: [id number]  Referring provider: Emma Del Rio DO  Dx:   Encounter Diagnosis     ICD-10-CM    1  Other chronic pain  G89 29                   Subjective: Pt had increased pain last week, did not attend PT      Objective: See treatment diary below      Assessment: Tolerated treatment well  Patient demonstrated fatigue post treatment and would benefit from continued PT      Plan: Continue per plan of care  Progress treatment as tolerated         Precautions: AAA, CKD    Manuals                                                           Neuro Re-Ed                                                                                                        Ther Ex Pool Pool     ROYAL BLUE CUFFS      Walking laps  5x 5x 5x 5x 5x 5x 5x     HS/piriformis stretch  30"x3 ea 30"x3 ea 30"x3 ea 30x3s 3x30s 3x30s 3x30s     Std hip abd/flex/ext  1x20 ea x30  x30  x30 ea x30 ea x30 ea x30 ea     Turtle flexion  1x10 x10 L/R/C x10 L/R/C x10 L/R/C x10 L/R/C x10 L/R/C x10 L/R/C     Ball press down  5"x20 5"x20 5"x20 5" x15 L/R/C 5" x15 L/R/C 5" x15 L/R/C 5" x15 L/R/C     Heel raise/toe raise  1x30 ea x30  x30  x30 x30 x30 x30     Side step  4x 5x 5x 10x 10' 10x 10x     Bike-floats  5 min 5' 5' 5' 5' 5' 10x     DKTC w B UE Floats     30x5s 30x5s 30x5s 30x5s     Step ups   2x10 ea 2x10 ea 30x ea 30x ea 30x                                              Ther Activity                                       Gait Training                                       Modalities

## 2022-05-05 ENCOUNTER — OFFICE VISIT (OUTPATIENT)
Dept: PHYSICAL THERAPY | Age: 77
End: 2022-05-05
Payer: MEDICARE

## 2022-05-05 DIAGNOSIS — G89.29 OTHER CHRONIC PAIN: Primary | ICD-10-CM

## 2022-05-05 PROCEDURE — 97113 AQUATIC THERAPY/EXERCISES: CPT | Performed by: SPECIALIST/TECHNOLOGIST

## 2022-05-05 NOTE — PROGRESS NOTES
Daily Note     Today's date: 2022  Patient name: Jyothi Morataya  : 1945  MRN: [id number]  Referring provider: Luis Muhammad DO  Dx:   Encounter Diagnosis     ICD-10-CM    1  Other chronic pain  G89 29                   Subjective: Pt describes Diffuse LBP and intermittent pain down anterior thigh  Objective: See treatment diary below    Assessment:  Pt denies reproduction of LBP with assigned therex this visit  Tolerated treatment well  Patient demonstrated fatigue post treatment, exhibited good technique with therapeutic exercises and would benefit from continued PT    Plan: Continue per plan of care  Progress treatment as tolerated         Precautions: AAA, CKD    Manuals                   Neuro Re-Ed                          Ther Ex Pool Pool     ROYAL BLUE CUFFS      Walking laps  5x 5x 5x 5x 5x 5x 5x 5x    HS/piriformis stretch  30"x3 ea 30"x3 ea 30"x3 ea 30x3s 3x30s 3x30s 3x30s 3x30s    SKTC         5x10s ea    Std hip abd/flex/ext  1x20 ea x30  x30  x30 ea x30 ea x30 ea x30 ea x30 ea    Turtle flexion  1x10 x10 L/R/C x10 L/R/C x10 L/R/C x10 L/R/C x10 L/R/C x10 L/R/C x10 L/R/C    Ball press down  5"x20 5"x20 5"x20 5" x15 L/R/C 5" x15 L/R/C 5" x15 L/R/C 5" x15 L/R/C 5" x20 C only    Heel raise/toe raise  1x30 ea x30  x30  x30 x30 x30 x30 x30    Side step  4x 5x 5x 10x 10' 10x 10x 10x    Bike-floats  5 min 5' 5' 5' 5' 5' 10x 5'    DKTC w B UE Floats     30x5s 30x5s 30x5s 30x5s 30x5s    Step ups   2x10 ea 2x10 ea 30x ea 30x ea 30x                                              Ther Activity                                       Gait Training                                       Modalities

## 2022-05-09 ENCOUNTER — OFFICE VISIT (OUTPATIENT)
Dept: PHYSICAL THERAPY | Age: 77
End: 2022-05-09
Payer: MEDICARE

## 2022-05-09 DIAGNOSIS — G89.29 OTHER CHRONIC PAIN: Primary | ICD-10-CM

## 2022-05-09 PROCEDURE — 97113 AQUATIC THERAPY/EXERCISES: CPT | Performed by: SPECIALIST/TECHNOLOGIST

## 2022-05-09 NOTE — PROGRESS NOTES
Daily Note     Today's date: 2022  Patient name: Yojana Feldman  : 1945  MRN: [id number]  Referring provider: Gina Tijerina DO  Dx:   Encounter Diagnosis     ICD-10-CM    1  Other chronic pain  G89 29                   Subjective: Pt reports B thigh tightness which she has been taking increased prescription medication to ease symptoms  Objective: See treatment diary below    Assessment: Pt able to tolerate added cuff wt resistance this visit  Added quad stretch to address pt's subjective complaints  Tolerated treatment well  Patient demonstrated fatigue post treatment, exhibited good technique with therapeutic exercises and would benefit from continued PT    Plan: Continue per plan of care  Progress treatment as tolerated          Precautions: AAA, CKD    Manuals                  Neuro Re-Ed                          Ther Ex Pool Pool     ROYAL BLUE CUFFS      Walking laps  5x 5x 5x 5x 5x 5x 5x 5x 5x   HS/piriformis stretch, quad stretch  30"x3 ea 30"x3 ea 30"x3 ea 30x3s 3x30s 3x30s 3x30s 3x30s 3x30s   SKTC         5x10s ea 5x10s ea   Std hip abd/flex/ext  1x20 ea x30  x30  x30 ea x30 ea x30 ea x30 ea x30 ea Flx, abd 30x ea   Turtle flexion  1x10 x10 L/R/C x10 L/R/C x10 L/R/C x10 L/R/C x10 L/R/C x10 L/R/C x10 L/R/C x10 L/R/C   Ball press down  5"x20 5"x20 5"x20 5" x15 L/R/C 5" x15 L/R/C 5" x15 L/R/C 5" x15 L/R/C 5" x20 C only 5" x20 C only    Heel raise/toe raise  1x30 ea x30  x30  x30 x30 x30 x30 x30 x30   Side step  4x 5x 5x 10x 10' 10x 10x 10x x10   Bike-floats  5 min 5' 5' 5' 5' 5' 10x 5' 5'   DKTC w B UE Floats     30x5s 30x5s 30x5s 30x5s 30x5s 30x5s   Step ups   2x10 ea 2x10 ea 30x ea 30x ea 30x    np                                          Ther Activity                                       Gait Training                                       Modalities

## 2022-05-12 ENCOUNTER — OFFICE VISIT (OUTPATIENT)
Dept: PHYSICAL THERAPY | Age: 77
End: 2022-05-12
Payer: MEDICARE

## 2022-05-12 DIAGNOSIS — G89.29 OTHER CHRONIC PAIN: Primary | ICD-10-CM

## 2022-05-12 PROCEDURE — 97113 AQUATIC THERAPY/EXERCISES: CPT | Performed by: SPECIALIST/TECHNOLOGIST

## 2022-05-12 NOTE — PROGRESS NOTES
Daily Note     Today's date: 2022  Patient name: Dejah Connelly  : 1945  MRN: [id number]  Referring provider: Francesca Duong DO  Dx:   Encounter Diagnosis     ICD-10-CM    1  Other chronic pain  G89 29                   Subjective: LE sx persist intermittently  Objective: See treatment diary below      Assessment: Lumbar flexion remains preferential movement pattern for radicular sx centralization- strengthening remains appropriate in flexion pattern  Tolerated treatment well  Patient demonstrated fatigue post treatment, exhibited good technique with therapeutic exercises and would benefit from continued PT      Plan: Continue per plan of care        Precautions: AAA, CKD    Manuals                  Neuro Re-Ed                      Ther Ex ROYAL BLUE CUFFS          Walking laps 5x 5x 5x 5x 5x      HS/piriformis stretch, quad stretch 3x30s 3x30s 3x30s 3x30s 3x30s ea      SKTC   5x10s ea 5x10s ea 5x10s      Std hip abd/flex/ext x30 ea x30 ea x30 ea Flx, abd 30x ea flx abd 30x ea      Turtle flexion x10 L/R/C x10 L/R/C x10 L/R/C x10 L/R/C x15 L/R/C      Ball press down 5" x15 L/R/C 5" x15 L/R/C 5" x20 C only 5" x20 C only  5" x20 L/R/C      Heel raise/toe raise x30 x30 x30 x30 x30      Side step 10x 10x 10x x10 x5      Bike-floats 5' 10x 5' 5' 5'      DKTC w B UE Floats 30x5s 30x5s 30x5s 30x5s 30x5s      Step ups 30x    np       Standing Hip Flexion     2 min                             Ther Activity                                 Gait Training                                 Modalities

## 2022-05-16 ENCOUNTER — OFFICE VISIT (OUTPATIENT)
Dept: PHYSICAL THERAPY | Age: 77
End: 2022-05-16
Payer: MEDICARE

## 2022-05-16 DIAGNOSIS — G89.29 OTHER CHRONIC PAIN: Primary | ICD-10-CM

## 2022-05-16 PROCEDURE — 97113 AQUATIC THERAPY/EXERCISES: CPT | Performed by: SPECIALIST/TECHNOLOGIST

## 2022-05-16 NOTE — PROGRESS NOTES
Daily Note     Today's date: 2022  Patient name: Janae Butler  : 1945  MRN: [id number]  Referring provider: Guilherme Dupree DO  Dx:   Encounter Diagnosis     ICD-10-CM    1  Other chronic pain  G89 29                   Subjective: No changes to report  Objective: See treatment diary below    Assessment:  Lumbar flexion remains preferential movement pattern- decreased severity of LE radicular sx  Tolerated treatment well  Patient demonstrated fatigue post treatment, exhibited good technique with therapeutic exercises and would benefit from continued PT    Plan: Continue per plan of care  Progress treatment as tolerated         Precautions: AAA, CKD    Manuals                 Neuro Re-Ed                      Ther Ex ROYAL BLUE CUFFS     ROYAL BLUE CUFFS     Walking laps 5x 5x 5x 5x 5x 5x     HS/piriformis stretch, quad stretch 3x30s 3x30s 3x30s 3x30s 3x30s ea 3x30s ea     SKTC   5x10s ea 5x10s ea 5x10s 5x10s     Std hip abd/flex/ext x30 ea x30 ea x30 ea Flx, abd 30x ea flx abd 30x ea Flx, abd 30x ea     Turtle flexion x10 L/R/C x10 L/R/C x10 L/R/C x10 L/R/C x15 L/R/C x15 ea L/R/C     Ball press down 5" x15 L/R/C 5" x15 L/R/C 5" x20 C only 5" x20 C only  5" x20 L/R/C 5" x20 L/R/C     Heel raise/toe raise x30 x30 x30 x30 x30 x30     Side step 10x 10x 10x x10 x5 x10     Bike-floats 5' 10x 5' 5' 5' 5'     DKTC w B UE Floats 30x5s 30x5s 30x5s 30x5s 30x5s 30x5s     Step ups 30x    np       Standing Hip Flexion     2 min  2 min     FW/BW UE Paddles      2 min ea                Ther Activity                                 Gait Training                                 Modalities

## 2022-05-23 ENCOUNTER — OFFICE VISIT (OUTPATIENT)
Dept: PHYSICAL THERAPY | Age: 77
End: 2022-05-23
Payer: MEDICARE

## 2022-05-23 DIAGNOSIS — G89.29 OTHER CHRONIC PAIN: Primary | ICD-10-CM

## 2022-05-23 PROCEDURE — 97113 AQUATIC THERAPY/EXERCISES: CPT | Performed by: SPECIALIST/TECHNOLOGIST

## 2022-05-26 ENCOUNTER — OFFICE VISIT (OUTPATIENT)
Dept: PHYSICAL THERAPY | Age: 77
End: 2022-05-26
Payer: MEDICARE

## 2022-05-26 DIAGNOSIS — G89.29 OTHER CHRONIC PAIN: Primary | ICD-10-CM

## 2022-05-26 PROCEDURE — 97113 AQUATIC THERAPY/EXERCISES: CPT | Performed by: SPECIALIST/TECHNOLOGIST

## 2022-05-26 PROCEDURE — 97164 PT RE-EVAL EST PLAN CARE: CPT | Performed by: SPECIALIST/TECHNOLOGIST

## 2022-05-26 NOTE — PROGRESS NOTES
Daily Note     Today's date: 2022  Patient name: Jen Mendez  : 1945  MRN: [id number]  Referring provider: Alan Prater DO  Dx:   Encounter Diagnosis     ICD-10-CM    1  Other chronic pain  G89 29                   Subjective: Pt reports consistently decreased sharp pains, doing well currenty  Objective: See treatment diary below    Goals  In 4 weeks pt will:  -Be independent with phase I of HEP- met  -Increase LE strength by 1/2 grade- met  -Increase Lumber ROM by 25%- met    By discharge pt will:  -Be independent with Phase II of HEP- met  -Demonstrate full LE strength- met  -Demonstrate full Lumbar ROM- progressing  -Report minimal pain with ADLs-met    Assessment: Pt has met or is progressing via HEP therapeutic goals  Pt is appropriate for discharge at this time  Tolerated treatment well  Plan: Discharge to [facility]        Precautions: AAA, CKD    Manuals               Neuro Re-Ed                      Ther Ex ROYAL BLUE CUFFS     ROYAL BLUE CUFFS [place] Blue Cuffs [place] Cuffs   Walking laps 5x 5x 5x 5x 5x 5x 5x 5x   HS/piriformis stretch, quad stretch 3x30s 3x30s 3x30s 3x30s 3x30s ea 3x30s ea 3x30s ea 3x30s   SKTC   5x10s ea 5x10s ea 5x10s 5x10s 5x10s 5x10s   Std hip abd/flex/ext x30 ea x30 ea x30 ea Flx, abd 30x ea flx abd 30x ea Flx, abd 30x ea Flx, abd 30x ea Flx, abd 30x   Turtle flexion x10 L/R/C x10 L/R/C x10 L/R/C x10 L/R/C x15 L/R/C x15 ea L/R/C x15 ea L/R/C x15 ea L/R/C   Ball press down 5" x15 L/R/C 5" x15 L/R/C 5" x20 C only 5" x20 C only  5" x20 L/R/C 5" x20 L/R/C 5" x20 L/R/C 5" x20 L/R/C   Heel raise/toe raise x30 x30 x30 x30 x30 x30 x30 x30   Side step 10x 10x 10x x10 x5 x10 x10 x10   Bike-floats 5' 10x 5' 5' 5' 5' 5' 5'   DKTC w B UE Floats 30x5s 30x5s 30x5s 30x5s 30x5s 30x5s 30x5s 30x5s   Step ups 30x    np       Standing Hip Flexion     2 min  2 min 2 min  2 min   FW/BW UE Paddles      2 min ea 2 min 2 min              Ther Activity                                 Gait Training                                 Modalities

## 2022-06-07 ENCOUNTER — HOSPITAL ENCOUNTER (OUTPATIENT)
Dept: NON INVASIVE DIAGNOSTICS | Facility: CLINIC | Age: 77
Discharge: HOME | End: 2022-06-07
Payer: MEDICARE

## 2022-06-07 DIAGNOSIS — I70.213 ATHEROSCLEROSIS OF NATIVE ARTERY OF BOTH LOWER EXTREMITIES WITH INTERMITTENT CLAUDICATION (HCC): ICD-10-CM

## 2022-06-07 DIAGNOSIS — I71.4 ABDOMINAL AORTIC ANEURYSM (AAA) WITHOUT RUPTURE (HCC): ICD-10-CM

## 2022-06-07 PROCEDURE — 93925 LOWER EXTREMITY STUDY: CPT | Performed by: SURGERY

## 2022-06-07 PROCEDURE — 93923 UPR/LXTR ART STDY 3+ LVLS: CPT

## 2022-06-07 PROCEDURE — 93922 UPR/L XTREMITY ART 2 LEVELS: CPT | Performed by: SURGERY

## 2022-06-07 PROCEDURE — 93925 LOWER EXTREMITY STUDY: CPT

## 2022-06-07 PROCEDURE — 93978 VASCULAR STUDY: CPT

## 2022-06-09 PROCEDURE — 93978 VASCULAR STUDY: CPT | Performed by: SURGERY

## 2022-07-28 ENCOUNTER — OFFICE VISIT (OUTPATIENT)
Dept: VASCULAR SURGERY | Facility: CLINIC | Age: 77
End: 2022-07-28
Payer: MEDICARE

## 2022-07-28 VITALS
WEIGHT: 127 LBS | HEART RATE: 56 BPM | SYSTOLIC BLOOD PRESSURE: 140 MMHG | HEIGHT: 64 IN | BODY MASS INDEX: 21.68 KG/M2 | DIASTOLIC BLOOD PRESSURE: 80 MMHG

## 2022-07-28 DIAGNOSIS — I71.40 ABDOMINAL AORTIC ANEURYSM (AAA) WITHOUT RUPTURE: ICD-10-CM

## 2022-07-28 DIAGNOSIS — I70.213 ATHEROSCLEROSIS OF NATIVE ARTERY OF BOTH LOWER EXTREMITIES WITH INTERMITTENT CLAUDICATION (HCC): Primary | ICD-10-CM

## 2022-07-28 PROCEDURE — 99214 OFFICE O/P EST MOD 30 MIN: CPT | Performed by: NURSE PRACTITIONER

## 2022-07-28 RX ORDER — GABAPENTIN 100 MG/1
100 CAPSULE ORAL DAILY
COMMUNITY

## 2022-07-28 NOTE — ASSESSMENT & PLAN NOTE
-Aortoiliac duplex measured infrarenal AAA 2 5 cm   -Prior CT scan measured 3 4 cm   -Repeat yearly duplex   -Blood pressure adequately controlled   -Follow up in the office in 1 year

## 2022-07-28 NOTE — ASSESSMENT & PLAN NOTE
-Mild bilateral ICA stenosis  -Repeat CV duplex next year  -Smoking cessation  -Restart aspirin 81 mg   -Continue Lipitor  -Follow up in the office in 1 year

## 2022-07-28 NOTE — ASSESSMENT & PLAN NOTE
14-year-old female with HTN, HLD, smoker, CKD 3, aortic atherosclerosis, small AAA, PAD w/ bilateral SFA occlusion and intermittent bilateral calf claudication symptoms, mild bilateral carotid stenosis, chronic pain  Patient presents to the office for yearly visit to review abdominal and lower extremity arterial duplex 6/8/22   -AOIL showed infrarenal fusiform AAA 2 5 cm and right renal artery stenosis  -LEAD showed long segment bilateral SFA occlusion, right GEN 0 7/118/99 and left GEN 0 53/80/65   -Stable intermittent bilateral calf claudication symptoms when walking on incline   -No major limitation in daily activities   -Restart aspirin 81 mg   -Continue Lipitor  -Repeat abdominal and lower extremity arterial do duplex in 1 year   -Work on quitting smoking completely  She is cut back from 3 packs of cigarettes per day to half pack of cigarettes daily    Encouraged complete smoking cessation  -Follow up in the office in 1 year or sooner if change in lower extremity symptoms

## 2022-07-28 NOTE — PROGRESS NOTES
Assessment/Plan:    Atherosclerosis of native artery of both lower extremities with intermittent claudication [facility])  68-year-old female with HTN, HLD, smoker, CKD 3, aortic atherosclerosis, small AAA, PAD w/ bilateral SFA occlusion and intermittent bilateral calf claudication symptoms, mild bilateral carotid stenosis, chronic pain  Patient presents to the office for yearly visit to review abdominal and lower extremity arterial duplex 6/8/22   -AOIL showed infrarenal fusiform AAA 2 5 cm and right renal artery stenosis  -LEAD showed long segment bilateral SFA occlusion, right GEN 0 7/118/99 and left GEN 0 53/80/65   -Stable intermittent bilateral calf claudication symptoms when walking on incline   -No major limitation in daily activities   -Restart aspirin 81 mg   -Continue Lipitor  -Repeat abdominal and lower extremity arterial do duplex in 1 year   -Work on quitting smoking completely  She is cut back from 3 packs of cigarettes per day to half pack of cigarettes daily  Encouraged complete smoking cessation  -Follow up in the office in 1 year or sooner if change in lower extremity symptoms    Abdominal aortic aneurysm (AAA) without rupture (HCC)  -Aortoiliac duplex measured infrarenal AAA 2 5 cm   -Prior CT scan measured 3 4 cm   -Repeat yearly duplex   -Blood pressure adequately controlled   -Follow up in the office in 1 year    Carotid artery stenosis  -Mild bilateral ICA stenosis  -Repeat CV duplex next year  -Smoking cessation  -Restart aspirin 81 mg   -Continue Lipitor  -Follow up in the office in 1 year       Diagnoses and all orders for this visit:    Atherosclerosis of native artery of both lower extremities with intermittent claudication ([address] )  -     VAS abdominal aorta/iliacs; complete study; Future  -     VAS lower limb arterial duplex, complete bilateral; Future    Abdominal aortic aneurysm (AAA) without rupture (HCC)  -     VAS abdominal aorta/iliacs; complete study;  Future    Other orders  - gabapentin (NEURONTIN) 100 mg capsule; Take 100 mg by mouth daily          Subjective:      Patient ID: Edi Salcido is a 68 y o  female  Patient presents today to review BLAYNE and AOIL done 6/7  Known AAA, admits to chronic back pain at baseline, denies any new/worsening abdominal pain or back pain  She also experiences b/l calf tightening w/ walking up hills/inclines  HPI  26-year-old female with HTN, HLD, smoker, CKD 3, aortic atherosclerosis, small AAA, PAD w/ bilateral SFA occlusion and intermittent bilateral calf claudication symptoms, mild bilateral carotid stenosis, chronic pain  Patient presents to the office for yearly visit to review abdominal and lower extremity arterial duplex 6/8/22   Patient last seen in the office last year  She has neurogenic claudication  She is following with pain management gets injections with excellent response  She does have bilateral calf claudication symptoms when walking on incline  She does walk regularly for exercise  No major limitation in her daily activities  No ischemic rest pain or ischemic tissue loss  She does continue to smoke approximately half pack cigarettes per day  This is decreased compared to prior smoking 3 packs per day  Encouraged complete smoking cessation  AOIL showed infrarenal fusiform AAA 2 5 cm and right renal artery stenosis  LEAD showed long segment bilateral SFA occlusion, right GEN 0 7/118/99 and left GEN 0 53/80/65   CV duplex 9/14/2020 showed bilateral ICA less 50% stenosisThe following portions of the patient's history were reviewed and updated as appropriate: allergies, current medications, past family history, past medical history, past social history, past surgical history and problem list   ROS reviewed     Review of Systems   Constitutional: Negative  HENT: Negative  Eyes: Negative  Respiratory: Positive for shortness of breath  Cardiovascular: Negative  Gastrointestinal: Negative      Endocrine: Negative  Genitourinary: Negative  Musculoskeletal: Positive for arthralgias and back pain  Skin: Negative  Allergic/Immunologic: Negative  Neurological: Negative  Hematological: Negative  Psychiatric/Behavioral: Negative  Objective:    I have reviewed and made appropriate changes to the review of systems input by the medical assistant  Vitals:    07/28/22 1135   BP: 140/80   BP Location: Right arm   Patient Position: Sitting   Pulse: 56   Weight: 57 6 kg (127 lb)   Height: 5' 4" (1 626 m)       Patient Active Problem List   Diagnosis    Abdominal aortic aneurysm (AAA) without rupture (HCC)    Atherosclerosis of native artery of both lower extremities with intermittent claudication (HCC)    Back pain    Bradycardia    Carotid artery stenosis    Cervical spinal stenosis    Diverticulosis of large intestine without hemorrhage    Essential hypertension    History of colonic polyps    Mixed hyperlipidemia    Nicotine dependence    Diverticulosis of intestine without bleeding    Hx of colonic polyps    Dissection of thoracic aorta (HCC)    Stage 3a chronic kidney disease ([facility] [address] )       Past Surgical History:   Procedure Laterality Date    BACK SURGERY      herniated disc    COLONOSCOPY      DC COLONOSCOPY FLX DX W/COLLJ SPEC WHEN PFRMD N/A 12/3/2018    Procedure: COLONOSCOPY;  Surgeon: Ese Brown MD;  Location: [facility];   Service: Colorectal       Family History   Problem Relation Age of Onset    Congenital heart disease Mother     Lung cancer Father     Alcohol abuse Father     Endometrial cancer Sister 46    Ovarian cancer Maternal Aunt 48    No Known Problems Daughter     No Known Problems Maternal Grandmother     No Known Problems Maternal Grandfather     No Known Problems Paternal Grandmother     No Known Problems Paternal Grandfather     No Known Problems Sister     No Known Problems Sister     Skin cancer Brother        Social History Socioeconomic History    Marital status:       Spouse name: Not on file    Number of children: Not on file    Years of education: Not on file    Highest education level: Not on file   Occupational History    Not on file   Tobacco Use    Smoking status: Current Every Day Smoker     Packs/day: 0 50    Smokeless tobacco: Never Used    Tobacco comment: last cigarette 3 weeks ago trying to quit   Vaping Use    Vaping Use: Never used   Substance and Sexual Activity    Alcohol use: Yes     Comment: wine daily    Drug use: No    Sexual activity: Not on file   Other Topics Concern    Not on file   Social History Narrative    Not on file     Social Determinants of Health     Financial Resource Strain: Not on file   Food Insecurity: Not on file   Transportation Needs: Not on file   Physical Activity: Not on file   Stress: Not on file   Social Connections: Not on file   Intimate Partner Violence: Not on file   Housing Stability: Not on file       Allergies   Allergen Reactions    Codeine Vomiting         Current Outpatient Medications:     ALPRAZolam (XANAX) 0 25 mg tablet, , Disp: , Rfl:     amLODIPine (NORVASC) 5 mg tablet, , Disp: , Rfl:     atorvastatin (LIPITOR) 40 mg tablet, Take 40 mg by mouth, Disp: , Rfl:     Calcium 250 MG CAPS, Take by mouth, Disp: , Rfl:     cholecalciferol (VITAMIN D3) 1,000 units tablet, Take 1,000 Units by mouth, Disp: , Rfl:     Flaxseed Oil OIL, Take 1 tablet by mouth, Disp: , Rfl:     gabapentin (NEURONTIN) 100 mg capsule, Take 100 mg by mouth daily, Disp: , Rfl:     hydrochlorothiazide (HYDRODIURIL) 12 5 mg tablet, Take 12 5 mg by mouth daily, Disp: , Rfl: 1    losartan (COZAAR) 100 MG tablet, Take 100 mg by mouth daily, Disp: , Rfl: 1    MAGNESIUM PO, Take 1 tablet by mouth every other day, Disp: , Rfl:     metoprolol succinate (TOPROL-XL) 100 mg 24 hr tablet, , Disp: , Rfl:     Multiple Vitamins-Minerals (MULTIVITAMIN ADULT PO), Take 1 capsule by mouth, Disp: , Rfl:     Thiamine HCl (VITAMIN B-1 PO), Take 1 tablet by mouth, Disp: , Rfl:     aspirin 81 MG tablet, Take 1 tablet by mouth daily (Patient not taking: Reported on 7/28/2022), Disp: , Rfl:     losartan-hydrochlorothiazide (HYZAAR) 100-12 5 MG per tablet, , Disp: , Rfl:     /80 (BP Location: Right arm, Patient Position: Sitting)   Pulse 56   Ht 5' 4" (1 626 m)   Wt 57 6 kg (127 lb)   BMI 21 80 kg/m²          Physical Exam  Vitals and nursing note reviewed  Constitutional:       Appearance: She is well-developed  HENT:      Head: Normocephalic and atraumatic  Eyes:      Extraocular Movements: Extraocular movements intact  Neck:      Vascular: No carotid bruit  Cardiovascular:      Pulses:           Femoral pulses are 2+ on the right side and 2+ on the left side  Dorsalis pedis pulses are 0 on the right side and 0 on the left side  Posterior tibial pulses are 0 on the right side and 0 on the left side  Heart sounds: Normal heart sounds  Pulmonary:      Effort: Pulmonary effort is normal       Breath sounds: Normal breath sounds  Abdominal:      General: Bowel sounds are normal       Palpations: Abdomen is soft  Musculoskeletal:         General: Normal range of motion  Cervical back: Neck supple  Skin:     General: Skin is warm  Neurological:      General: No focal deficit present  Mental Status: She is alert and oriented to person, place, and time  Psychiatric:         Behavior: Behavior normal          Thought Content:  Thought content normal

## 2022-07-28 NOTE — PATIENT INSTRUCTIONS
Continue walking routine  Restart aspirin 81 mg  Work on quitting smoking, continue to cut back and quit completely     Will repeat your Doppler studies in 1 year  Follow-up in the office in 1 year    Peripheral Artery Disease   WHAT YOU NEED TO KNOW:   What is peripheral artery disease (PAD)? PAD is narrow, weak, or blocked arteries  It may affect any arteries outside of your heart and brain  PAD is usually the result of a buildup of fat and cholesterol, also called plaque, along your artery walls  Inflammation, a blood clot, or abnormal cell growth could also block your arteries  PAD prevents normal blood flow to your legs and arms  You are at risk of an amputation if poor blood flow keeps wounds from healing or causes gangrene (tissue death)  Without treatment, PAD can also cause a heart attack or stroke  What increases my risk for PAD? Smoking cigarettes    Diabetes    High blood pressure    High cholesterol    Age older than 40 years    Heart disease or a family history of heart disease    What are the signs and symptoms of PAD? Mild PAD usually does not cause symptoms  As the disease worsens over time, you may have the following:  Pain or cramps in your leg or hip while you walk    A numb, weak, or heavy feeling in your legs    Dry, scaly, red, or pale skin on your legs    Thick or brittle nails, or hair loss on your arms and legs    Foot sores that will not heal    Burning or aching in your feet and toes while resting (this may be worse when you lie down)    How is PAD diagnosed? Angiography  is a test that shows pictures of the arteries in your arms and legs  You will be given contrast liquid to help the arteries show up better on the pictures  The pictures will be taken with an MRI or CT scan  Tell the healthcare provider if you have ever had an allergic reaction to contrast liquid  Do not enter the MRI room with anything metal  Metal can cause serious injury   Tell a healthcare provider if you have any metal in or on your body  Doppler ankle brachial index (GEN)  is a test that compares blood pressure in your ankles to blood pressure in your arms  This tells your healthcare provider how well blood is flowing through the arteries in your legs  How is PAD treated? Treatment can help reduce your risk of a heart attack, stroke, or amputation  You may need more than one of the following:  Medicines  may be given:    Antiplatelets , such as aspirin, help prevent blood clots  Take your antiplatelet medicine exactly as directed  These medicines make it more likely for you to bleed or bruise  If you are told to take aspirin, do not take acetaminophen or ibuprofen instead  Statin medicine  helps lower your cholesterol and prevents PAD from getting worse  A supervised exercise program  helps you stay active in normal daily activities  Healthcare providers will help you safely walk or do strength training exercises 3 times a week for 30 to 60 minutes  You will do this for several months, then transition to walking on your own  Angioplasty  is a procedure to open your artery so blood can flow through normally  A thin tube called a catheter is used to insert a small balloon into your artery  The balloon is inflated to open your blocked artery, and then removed  A tube called a stent may be placed in your artery to hold it open  Bypass surgery  is used to make a new connection to your artery with a vein from another part of your body, or an artificial graft  The vein or graft is attached to your artery above and below your blockage  This allows blood to flow around the blocked portion of your artery  How can I manage PAD? Do not smoke  Nicotine and other chemicals in cigarettes and cigars can worsen PAD  They can also increase your risk for a heart attack or stroke  Ask your healthcare provider for information if you currently smoke and need help to quit   E-cigarettes or smokeless tobacco still contain nicotine  Talk to your healthcare provider before you use these products  Manage other health conditions  Take your medicines as directed  Follow your healthcare provider's instructions if you have high blood pressure or high cholesterol  Perform foot care and check your blood sugar levels as directed if you have diabetes  Eat heart-healthy foods  Eat whole grains, fruits, and vegetables every day  Limit salt and high-fat foods  Ask your healthcare provider for more information on a heart healthy diet  Ask what a healthy weight is for you  Your healthcare provider can help you create a healthy weight-loss plan, if needed  Call your local emergency number (911 in the [address] [facility]) if:   You have any of the following signs of a heart attack:      Squeezing, pressure, or pain in your chest    You may  also have any of the following:     Discomfort or pain in your back, neck, jaw, stomach, or arm    Shortness of breath    Nausea or vomiting    Lightheadedness or a sudden cold sweat    You have any of the following signs of a stroke:      Numbness or drooping on one side of your face     Weakness in an arm or leg    Confusion or difficulty speaking    Dizziness, a severe headache, or vision loss    When should I seek immediate care? You have sores or wounds that will not heal     You notice black or discolored skin on your arm or leg  Your skin is cool to the touch  When should I call my doctor? You have leg pain when you walk 1/8 mile (200 meters) or less, even with treatment  Your legs are red, dry, or pale, even with treatment  You have questions or concerns about your condition or care  CARE AGREEMENT:   You have the right to help plan your care  Learn about your health condition and how it may be treated  Discuss treatment options with your healthcare providers to decide what care you want to receive  You always have the right to refuse treatment   The above information is an  only  It is not intended as medical advice for individual conditions or treatments  Talk to your doctor, nurse or pharmacist before following any medical regimen to see if it is safe and effective for you  © Copyright [address] 2022 Information is for End User's use only and may not be sold, redistributed or otherwise used for commercial purposes   All illustrations and images included in CareNotes® are the copyrighted property of [employer]  or [employer]

## 2022-11-22 ENCOUNTER — HOSPITAL ENCOUNTER (OUTPATIENT)
Dept: RADIOLOGY | Age: 77
Discharge: HOME | End: 2022-11-22

## 2022-11-22 VITALS — BODY MASS INDEX: 22.02 KG/M2 | WEIGHT: 129 LBS | HEIGHT: 64 IN

## 2022-11-22 DIAGNOSIS — Z12.31 ENCOUNTER FOR SCREENING MAMMOGRAM FOR MALIGNANT NEOPLASM OF BREAST: ICD-10-CM

## 2023-06-30 ENCOUNTER — HOSPITAL ENCOUNTER (OUTPATIENT)
Dept: NON INVASIVE DIAGNOSTICS | Facility: CLINIC | Age: 78
Discharge: HOME | End: 2023-06-30
Payer: MEDICARE

## 2023-06-30 DIAGNOSIS — I70.213 ATHEROSCLEROSIS OF NATIVE ARTERY OF BOTH LOWER EXTREMITIES WITH INTERMITTENT CLAUDICATION (HCC): ICD-10-CM

## 2023-06-30 DIAGNOSIS — I71.40 ABDOMINAL AORTIC ANEURYSM (AAA) WITHOUT RUPTURE (HCC): ICD-10-CM

## 2023-06-30 PROCEDURE — 93923 UPR/LXTR ART STDY 3+ LVLS: CPT

## 2023-06-30 PROCEDURE — 93978 VASCULAR STUDY: CPT

## 2023-06-30 PROCEDURE — 93925 LOWER EXTREMITY STUDY: CPT

## 2023-08-31 ENCOUNTER — OFFICE VISIT (OUTPATIENT)
Dept: VASCULAR SURGERY | Facility: CLINIC | Age: 78
End: 2023-08-31
Payer: MEDICARE

## 2023-08-31 VITALS
HEART RATE: 56 BPM | DIASTOLIC BLOOD PRESSURE: 80 MMHG | BODY MASS INDEX: 21.85 KG/M2 | SYSTOLIC BLOOD PRESSURE: 124 MMHG | WEIGHT: 128 LBS | HEIGHT: 64 IN

## 2023-08-31 DIAGNOSIS — I65.23 BILATERAL CAROTID ARTERY STENOSIS: ICD-10-CM

## 2023-08-31 DIAGNOSIS — F17.210 CIGARETTE NICOTINE DEPENDENCE WITHOUT COMPLICATION: ICD-10-CM

## 2023-08-31 DIAGNOSIS — I70.213 ATHEROSCLEROSIS OF NATIVE ARTERY OF BOTH LOWER EXTREMITIES WITH INTERMITTENT CLAUDICATION (HCC): Primary | ICD-10-CM

## 2023-08-31 DIAGNOSIS — I71.40 ABDOMINAL AORTIC ANEURYSM (AAA) WITHOUT RUPTURE, UNSPECIFIED PART (HCC): ICD-10-CM

## 2023-08-31 PROCEDURE — 99214 OFFICE O/P EST MOD 30 MIN: CPT | Performed by: NURSE PRACTITIONER

## 2023-08-31 NOTE — ASSESSMENT & PLAN NOTE
59-year-old female with HTN, HLD, smoker, CKD 3, aortic atherosclerosis, small AAA, PAD w/ bilateral SFA occlusion and intermittent bilateral calf claudication symptoms, mild bilateral carotid stenosis, chronic pain. Returns the office for yearly visit to review aortoiliac and lower extremity arterial duplex 6/13/2023    -AOIL showed 2.8 cm abdominal aortic ectasia, right renal artery stenosis  -LEAD showed bilateral SFA occlusion, left PFA stenosis, right GEN 0.68/109/71 and left GEN 0.5/92/49  -denies calf claudication symptoms. She is walking regularly for exercise. She can walk for longer durations than prior without developing calf claudication symptoms. She walks approximately 20 minutes  -complaining of left thigh aching at site of lipomas   -Recommended continued walking routine  -Counseled on the importance of smoking cessation. She has cut back to half pack cigarettes per day.   -Needs to quit completely  -Continue aspirin and statin therapy  -Repeat Doppler in 1 year  -Follow-up in the office in 1 year or sooner if change in lower extremity symptoms, ischemic rest pain develops or if ischemic wound develops

## 2023-08-31 NOTE — ASSESSMENT & PLAN NOTE
-Abdominal aortic ectasia 2.8 cm  -Continue with surveillance duplex  -Blood pressure well controlled

## 2023-08-31 NOTE — PATIENT INSTRUCTIONS
Walk daily for exercise. Work on quitting smoking. Repeat Doppler studies in 1 year. Follow-up in the office in 1 year or sooner if change in lower extremity symptoms with walking or wound develops on the foot    Peripheral Artery Disease   WHAT YOU NEED TO KNOW:   What is peripheral artery disease (PAD)? PAD is narrow, weak, or blocked arteries. It may affect any arteries outside of your heart and brain. PAD is usually the result of a buildup of fat and cholesterol, also called plaque, along your artery walls. Inflammation, a blood clot, or abnormal cell growth could also block your arteries. PAD prevents normal blood flow to your legs and arms. You are at risk of an amputation if poor blood flow keeps wounds from healing or causes gangrene (tissue death). Without treatment, PAD can also cause a heart attack or stroke. What increases my risk for PAD? Smoking cigarettes    Diabetes    High blood pressure    High cholesterol    Age older than 40 years    Heart disease or a family history of heart disease    What are the signs and symptoms of PAD? Mild PAD usually does not cause symptoms. As the disease worsens over time, you may have the following:  Pain or cramps in your leg or hip while you walk    A numb, weak, or heavy feeling in your legs    Dry, scaly, red, or pale skin on your legs    Thick or brittle nails, or hair loss on your arms and legs    Foot sores that will not heal    Burning or aching in your feet and toes while resting (this may be worse when you lie down)    How is PAD diagnosed? Angiography  is a test that shows pictures of the arteries in your arms and legs. You will be given contrast liquid to help the arteries show up better on the pictures. The pictures will be taken with an MRI or CT scan. Tell the healthcare provider if you have ever had an allergic reaction to contrast liquid. Do not enter the MRI room with anything metal. Metal can cause serious injury.  Tell a healthcare provider if you have any metal in or on your body. Doppler ankle brachial index (GEN)  is a test that compares blood pressure in your ankles to blood pressure in your arms. This tells your healthcare provider how well blood is flowing through the arteries in your legs. How is PAD treated? Treatment can help reduce your risk of a heart attack, stroke, or amputation. You may need more than one of the following:  Medicines  may be given:    Antiplatelets , such as aspirin, help prevent blood clots. Take your antiplatelet medicine exactly as directed. These medicines make it more likely for you to bleed or bruise. If you are told to take aspirin, do not take acetaminophen or ibuprofen instead. Statin medicine  helps lower your cholesterol and prevents PAD from getting worse. A supervised exercise program  helps you stay active in normal daily activities. Healthcare providers will help you safely walk or do strength training exercises 3 times a week for 30 to 60 minutes. You will do this for several months, then transition to walking on your own. Angioplasty  is a procedure to open your artery so blood can flow through normally. A thin tube called a catheter is used to insert a small balloon into your artery. The balloon is inflated to open your blocked artery, and then removed. A tube called a stent may be placed in your artery to hold it open. Bypass surgery  is used to make a new connection to your artery with a vein from another part of your body, or an artificial graft. The vein or graft is attached to your artery above and below your blockage. This allows blood to flow around the blocked portion of your artery. How can I manage PAD? Do not smoke. Nicotine and other chemicals in cigarettes and cigars can worsen PAD. They can also increase your risk for a heart attack or stroke. Ask your healthcare provider for information if you currently smoke and need help to quit.  E-cigarettes or smokeless tobacco still contain nicotine. Talk to your healthcare provider before you use these products. Manage other health conditions. Take your medicines as directed. Follow your healthcare provider's instructions if you have high blood pressure or high cholesterol. Perform foot care and check your blood sugar levels as directed if you have diabetes. Eat heart-healthy foods. Eat whole grains, fruits, and vegetables every day. Limit salt and high-fat foods. Ask your healthcare provider for more information on a heart healthy diet. Ask what a healthy weight is for you. Your healthcare provider can help you create a healthy weight-loss plan, if needed. Call your local emergency number (911 in the [address]) if:   You have any of the following signs of a heart attack:      Squeezing, pressure, or pain in your chest    You may  also have any of the following:     Discomfort or pain in your back, neck, jaw, stomach, or arm    Shortness of breath    Nausea or vomiting    Lightheadedness or a sudden cold sweat    You have any of the following signs of a stroke:      Numbness or drooping on one side of your face     Weakness in an arm or leg    Confusion or difficulty speaking    Dizziness, a severe headache, or vision loss    When should I seek immediate care? You have sores or wounds that will not heal.    You notice black or discolored skin on your arm or leg. Your skin is cool to the touch. When should I call my doctor? You have leg pain when you walk 1/8 mile (200 meters) or less, even with treatment. Your legs are red, dry, or pale, even with treatment. You have questions or concerns about your condition or care. CARE AGREEMENT:   You have the right to help plan your care. Learn about your health condition and how it may be treated. Discuss treatment options with your healthcare providers to decide what care you want to receive. You always have the right to refuse treatment.  The above information is an  only. It is not intended as medical advice for individual conditions or treatments. Talk to your doctor, nurse or pharmacist before following any medical regimen to see if it is safe and effective for you. © Copyright Lolet[employer] 2022 Information is for End User's use only and may not be sold, redistributed or otherwise used for commercial purposes.

## 2023-08-31 NOTE — ASSESSMENT & PLAN NOTE
-CV duplex 9/14/20 showed mild bilateral ICA <50%stenosis   -Repeat CV duplex next year  -Smoking cessation  -Aspirin 81 mg  -Continue Lipitor  -Follow-up in the office in 1 year

## 2023-08-31 NOTE — PROGRESS NOTES
Assessment/Plan:    Atherosclerosis of native artery of both lower extremities with intermittent claudication [facility])  79-year-old female with HTN, HLD, smoker, CKD 3, aortic atherosclerosis, small AAA, PAD w/ bilateral SFA occlusion and intermittent bilateral calf claudication symptoms, mild bilateral carotid stenosis, chronic pain. Returns the office for yearly visit to review aortoiliac and lower extremity arterial duplex 6/13/2023    -AOIL showed 2.8 cm abdominal aortic ectasia, right renal artery stenosis  -LEAD showed bilateral SFA occlusion, left PFA stenosis, right GEN 0.68/109/71 and left GEN 0.5/92/49  -denies calf claudication symptoms. She is walking regularly for exercise. She can walk for longer durations than prior without developing calf claudication symptoms. She walks approximately 20 minutes  -complaining of left thigh aching at site of lipomas   -Recommended continued walking routine  -Counseled on the importance of smoking cessation. She has cut back to half pack cigarettes per day. -Needs to quit completely  -Continue aspirin and statin therapy  -Repeat Doppler in 1 year  -Follow-up in the office in 1 year or sooner if change in lower extremity symptoms, ischemic rest pain develops or if ischemic wound develops        Abdominal aortic aneurysm (AAA) without rupture (HCC)  -Abdominal aortic ectasia 2.8 cm  -Continue with surveillance duplex  -Blood pressure well controlled    Carotid artery stenosis  -CV duplex 9/14/20 showed mild bilateral ICA <50%stenosis   -Repeat CV duplex next year  -Smoking cessation  -Aspirin 81 mg  -Continue Lipitor  -Follow-up in the office in 1 year       Diagnoses and all orders for this visit:    Atherosclerosis of native artery of both lower extremities with intermittent claudication (HCC)  -     VAS lower limb arterial duplex, complete bilateral; Future  -     VAS abdominal aorta/iliacs; complete study;  Future    Abdominal aortic aneurysm (AAA) without rupture, unspecified part ([address])  -     VAS lower limb arterial duplex, complete bilateral; Future  -     VAS abdominal aorta/iliacs; complete study; Future    Bilateral carotid artery stenosis  -     VAS carotid complete study; Future    Cigarette nicotine dependence without complication          Subjective:      Patient ID: Dewayne Ordonez is a 68 y.o. female. Patient presents to review AOIL/ BLAYNE done 6/30. Patient reports L thigh pain w/ walking, and can walk about 15- 20 minutes before having to stop and rest. Denies abdomen pain or back pain. HPI  80-year-old female with HTN, HLD, smoker, CKD 3, aortic atherosclerosis, small AAA, PAD w/ bilateral SFA occlusion and intermittent bilateral calf claudication symptoms, mild bilateral carotid stenosis, chronic pain. Returns the office for yearly visit to review aortoiliac and lower extremity arterial duplex 6/13/2023  Patient last seen/year by me. She denies calf claudication symptoms at present. She is walking for exercise. She notes she can walk further distances at shorter durations of time. She is mainly complaining of aching of the left thigh. She is doing stretching exercises. She continues to smoke half pack or less of cigarettes per day. She previously smoked 3 packs/day. She is on aspirin and atorvastatin. She denies any ischemic rest pain and is not experiencing any tissue loss. AOIL showed 2.8 cm abdominal aortic ectasia, right renal artery stenosis  LEAD showed bilateral SFA occlusion, left PFA stenosis, right GEN 0.68/109/71 and left GEN 0.5/92/49  The following portions of the patient's history were reviewed and updated as appropriate: allergies, current medications, past family history, past medical history, past social history, past surgical history and problem list.  ROS reviewed     Review of Systems   Constitutional: Negative. HENT: Negative. Eyes: Negative. Respiratory: Negative. Cardiovascular: Negative.     Gastrointestinal: Negative. Endocrine: Negative. Genitourinary: Negative. Musculoskeletal: Negative. Skin: Negative. Allergic/Immunologic: Negative. Neurological: Negative. Hematological: Negative. Psychiatric/Behavioral: Negative. Objective:  I have reviewed and made appropriate changes to the review of systems input by the medical assistant. Vitals:    08/31/23 1456   BP: 124/80   BP Location: Left arm   Patient Position: Sitting   Cuff Size: Standard   Pulse: 56   Weight: 58.1 kg (128 lb)   Height: 5' 3.5" (1.613 m)       Patient Active Problem List   Diagnosis   • Abdominal aortic aneurysm (AAA) without rupture (HCC)   • Atherosclerosis of native artery of both lower extremities with intermittent claudication (HCC)   • Back pain   • Bradycardia   • Carotid artery stenosis   • Cervical spinal stenosis   • Diverticulosis of large intestine without hemorrhage   • Essential hypertension   • History of colonic polyps   • Mixed hyperlipidemia   • Nicotine dependence   • Diverticulosis of intestine without bleeding   • Hx of colonic polyps   • Dissection of thoracic aorta (HCC)   • Stage 3a chronic kidney disease (HCC)       Past Surgical History:   Procedure Laterality Date   • BACK SURGERY      herniated disc   • COLONOSCOPY     • AK COLONOSCOPY FLX DX W/COLLJ SPEC WHEN PFRMD N/A 12/3/2018    Procedure: COLONOSCOPY;  Surgeon: Agapito Sweeney MD;  Location: [facility];   Service: Colorectal       Family History   Problem Relation Age of Onset   • Congenital heart disease Mother    • Lung cancer Father    • Alcohol abuse Father    • Endometrial cancer Sister 46   • Ovarian cancer Maternal Aunt 48   • No Known Problems Daughter    • No Known Problems Maternal Grandmother    • No Known Problems Maternal Grandfather    • No Known Problems Paternal Grandmother    • No Known Problems Paternal Grandfather    • No Known Problems Sister    • No Known Problems Sister    • Skin cancer Brother        Social History     Socioeconomic History   • Marital status:       Spouse name: Not on file   • Number of children: Not on file   • Years of education: Not on file   • Highest education level: Not on file   Occupational History   • Not on file   Tobacco Use   • Smoking status: Every Day     Packs/day: 0.50     Types: Cigarettes   • Smokeless tobacco: Never   • Tobacco comments:     last cigarette 3 weeks ago trying to quit   Vaping Use   • Vaping Use: Never used   Substance and Sexual Activity   • Alcohol use: Yes     Comment: wine daily   • Drug use: No   • Sexual activity: Not on file   Other Topics Concern   • Not on file   Social History Narrative   • Not on file     Social Determinants of Health     Financial Resource Strain: Not on file   Food Insecurity: Not on file   Transportation Needs: Not on file   Physical Activity: Not on file   Stress: Not on file   Social Connections: Not on file   Intimate Partner Violence: Not on file   Housing Stability: Not on file       Allergies   Allergen Reactions   • Codeine Vomiting         Current Outpatient Medications:   •  ALPRAZolam (XANAX) 0.25 mg tablet, , Disp: , Rfl:   •  amLODIPine (NORVASC) 5 mg tablet, , Disp: , Rfl:   •  aspirin 81 MG tablet, Take 1 tablet by mouth daily, Disp: , Rfl:   •  atorvastatin (LIPITOR) 40 mg tablet, Take 40 mg by mouth, Disp: , Rfl:   •  Calcium 250 MG CAPS, Take by mouth, Disp: , Rfl:   •  cholecalciferol (VITAMIN D3) 1,000 units tablet, Take 1,000 Units by mouth, Disp: , Rfl:   •  Flaxseed Oil OIL, Take 1 tablet by mouth, Disp: , Rfl:   •  hydrochlorothiazide (HYDRODIURIL) 12.5 mg tablet, Take 12.5 mg by mouth daily, Disp: , Rfl: 1  •  losartan (COZAAR) 100 MG tablet, Take 100 mg by mouth daily, Disp: , Rfl: 1  •  losartan-hydrochlorothiazide (HYZAAR) 100-12.5 MG per tablet, , Disp: , Rfl:   •  MAGNESIUM PO, Take 1 tablet by mouth every other day, Disp: , Rfl:   •  metoprolol succinate (TOPROL-XL) 100 mg 24 hr tablet, , Disp: , Rfl: •  Multiple Vitamins-Minerals (MULTIVITAMIN ADULT PO), Take 1 capsule by mouth, Disp: , Rfl:   •  Thiamine HCl (VITAMIN B-1 PO), Take 1 tablet by mouth, Disp: , Rfl:       /80 (BP Location: Left arm, Patient Position: Sitting, Cuff Size: Standard)   Pulse 56   Ht 5' 3.5" (1.613 m)   Wt 58.1 kg (128 lb)   BMI 22.32 kg/m²          Physical Exam  Vitals and nursing note reviewed. Constitutional:       Appearance: She is well-developed. HENT:      Head: Normocephalic and atraumatic. Cardiovascular:      Pulses:           Femoral pulses are 2+ on the right side and 2+ on the left side. Dorsalis pedis pulses are 0 on the right side and 0 on the left side. Posterior tibial pulses are 0 on the right side and 0 on the left side. Heart sounds: Normal heart sounds. Pulmonary:      Effort: Pulmonary effort is normal.      Breath sounds: Normal breath sounds. Abdominal:      General: Bowel sounds are normal.      Palpations: Abdomen is soft. Musculoskeletal:         General: Normal range of motion. Cervical back: Neck supple. Skin:     General: Skin is warm. Neurological:      Mental Status: She is alert and oriented to person, place, and time. Psychiatric:         Behavior: Behavior normal.         Thought Content:  Thought content normal.

## 2023-10-09 ENCOUNTER — HOSPITAL ENCOUNTER (OUTPATIENT)
Dept: RADIOLOGY | Age: 78
Discharge: HOME | End: 2023-10-09
Payer: MEDICARE

## 2023-10-09 DIAGNOSIS — M51.17 INTERVERTEBRAL DISC DISORDERS WITH RADICULOPATHY, LUMBOSACRAL REGION: ICD-10-CM

## 2023-10-09 DIAGNOSIS — M48.062 SPINAL STENOSIS, LUMBAR REGION WITH NEUROGENIC CLAUDICATION: ICD-10-CM

## 2023-10-09 PROCEDURE — 72148 MRI LUMBAR SPINE W/O DYE: CPT

## 2023-11-13 ENCOUNTER — APPOINTMENT (OUTPATIENT)
Dept: LAB | Age: 78
End: 2023-11-13
Payer: MEDICARE

## 2023-11-13 DIAGNOSIS — E78.2 MIXED HYPERLIPIDEMIA: ICD-10-CM

## 2023-11-13 DIAGNOSIS — I10 ESSENTIAL HYPERTENSION: ICD-10-CM

## 2023-11-13 LAB
ALBUMIN SERPL BCP-MCNC: 3.9 G/DL (ref 3.5–5)
ALP SERPL-CCNC: 136 U/L (ref 34–104)
ALT SERPL W P-5'-P-CCNC: 17 U/L (ref 7–52)
ANION GAP SERPL CALCULATED.3IONS-SCNC: 4 MMOL/L
AST SERPL W P-5'-P-CCNC: 19 U/L (ref 13–39)
BASOPHILS # BLD AUTO: 0.04 THOUSANDS/ÂΜL (ref 0–0.1)
BASOPHILS NFR BLD AUTO: 0 % (ref 0–1)
BILIRUB SERPL-MCNC: 0.51 MG/DL (ref 0.2–1)
BUN SERPL-MCNC: 16 MG/DL (ref 5–25)
CALCIUM SERPL-MCNC: 9.6 MG/DL (ref 8.4–10.2)
CHLORIDE SERPL-SCNC: 105 MMOL/L (ref 96–108)
CHOLEST SERPL-MCNC: 135 MG/DL
CO2 SERPL-SCNC: 31 MMOL/L (ref 21–32)
CREAT SERPL-MCNC: 0.81 MG/DL (ref 0.6–1.3)
EOSINOPHIL # BLD AUTO: 0.33 THOUSAND/ÂΜL (ref 0–0.61)
EOSINOPHIL NFR BLD AUTO: 4 % (ref 0–6)
ERYTHROCYTE [DISTWIDTH] IN BLOOD BY AUTOMATED COUNT: 12.7 % (ref 11.6–15.1)
GFR SERPL CREATININE-BSD FRML MDRD: 69 ML/MIN/1.73SQ M
GLUCOSE P FAST SERPL-MCNC: 88 MG/DL (ref 65–99)
HCT VFR BLD AUTO: 46.1 % (ref 34.8–46.1)
HDLC SERPL-MCNC: 66 MG/DL
HGB BLD-MCNC: 15.6 G/DL (ref 11.5–15.4)
IMM GRANULOCYTES # BLD AUTO: 0.04 THOUSAND/UL (ref 0–0.2)
IMM GRANULOCYTES NFR BLD AUTO: 0 % (ref 0–2)
LDLC SERPL CALC-MCNC: 43 MG/DL (ref 0–100)
LYMPHOCYTES # BLD AUTO: 1.34 THOUSANDS/ÂΜL (ref 0.6–4.47)
LYMPHOCYTES NFR BLD AUTO: 14 % (ref 14–44)
MCH RBC QN AUTO: 33.2 PG (ref 26.8–34.3)
MCHC RBC AUTO-ENTMCNC: 33.8 G/DL (ref 31.4–37.4)
MCV RBC AUTO: 98 FL (ref 82–98)
MONOCYTES # BLD AUTO: 0.69 THOUSAND/ÂΜL (ref 0.17–1.22)
MONOCYTES NFR BLD AUTO: 7 % (ref 4–12)
NEUTROPHILS # BLD AUTO: 7.02 THOUSANDS/ÂΜL (ref 1.85–7.62)
NEUTS SEG NFR BLD AUTO: 75 % (ref 43–75)
NONHDLC SERPL-MCNC: 69 MG/DL
NRBC BLD AUTO-RTO: 0 /100 WBCS
PLATELET # BLD AUTO: 176 THOUSANDS/UL (ref 149–390)
PMV BLD AUTO: 11.7 FL (ref 8.9–12.7)
POTASSIUM SERPL-SCNC: 3.9 MMOL/L (ref 3.5–5.3)
PROT SERPL-MCNC: 6.5 G/DL (ref 6.4–8.4)
RBC # BLD AUTO: 4.7 MILLION/UL (ref 3.81–5.12)
SODIUM SERPL-SCNC: 140 MMOL/L (ref 135–147)
TRIGL SERPL-MCNC: 131 MG/DL
WBC # BLD AUTO: 9.46 THOUSAND/UL (ref 4.31–10.16)

## 2023-11-13 PROCEDURE — 85025 COMPLETE CBC W/AUTO DIFF WBC: CPT

## 2023-11-13 PROCEDURE — 36415 COLL VENOUS BLD VENIPUNCTURE: CPT

## 2023-11-13 PROCEDURE — 80053 COMPREHEN METABOLIC PANEL: CPT

## 2023-11-13 PROCEDURE — 80061 LIPID PANEL: CPT

## 2023-11-28 ENCOUNTER — HOSPITAL ENCOUNTER (OUTPATIENT)
Dept: RADIOLOGY | Age: 78
Discharge: HOME | End: 2023-11-28
Payer: MEDICARE

## 2023-11-28 DIAGNOSIS — Z12.31 VISIT FOR SCREENING MAMMOGRAM: ICD-10-CM

## 2023-11-28 PROCEDURE — 77063 BREAST TOMOSYNTHESIS BI: CPT

## 2023-11-28 PROCEDURE — 77067 SCR MAMMO BI INCL CAD: CPT

## 2024-03-23 DIAGNOSIS — Z00.6 ENCOUNTER FOR EXAMINATION FOR NORMAL COMPARISON OR CONTROL IN CLINICAL RESEARCH PROGRAM: ICD-10-CM

## 2024-03-29 ENCOUNTER — APPOINTMENT (OUTPATIENT)
Dept: LAB | Facility: CLINIC | Age: 79
End: 2024-03-29

## 2024-03-29 DIAGNOSIS — Z00.6 ENCOUNTER FOR EXAMINATION FOR NORMAL COMPARISON OR CONTROL IN CLINICAL RESEARCH PROGRAM: ICD-10-CM

## 2024-03-29 PROCEDURE — 36415 COLL VENOUS BLD VENIPUNCTURE: CPT

## 2024-04-21 LAB
APOB+LDLR+PCSK9 GENE MUT ANL BLD/T: NOT DETECTED
BRCA1+BRCA2 DEL+DUP + FULL MUT ANL BLD/T: NOT DETECTED
MLH1+MSH2+MSH6+PMS2 GN DEL+DUP+FUL M: NOT DETECTED

## 2024-07-02 ENCOUNTER — HOSPITAL ENCOUNTER (OUTPATIENT)
Dept: NON INVASIVE DIAGNOSTICS | Facility: CLINIC | Age: 79
Discharge: HOME | End: 2024-07-02
Payer: MEDICARE

## 2024-07-02 DIAGNOSIS — I71.40 ABDOMINAL AORTIC ANEURYSM (AAA) WITHOUT RUPTURE, UNSPECIFIED PART (HCC): ICD-10-CM

## 2024-07-02 DIAGNOSIS — I70.213 ATHEROSCLEROSIS OF NATIVE ARTERY OF BOTH LOWER EXTREMITIES WITH INTERMITTENT CLAUDICATION (HCC): ICD-10-CM

## 2024-07-02 DIAGNOSIS — I65.23 BILATERAL CAROTID ARTERY STENOSIS: ICD-10-CM

## 2024-07-02 PROCEDURE — 93923 UPR/LXTR ART STDY 3+ LVLS: CPT

## 2024-07-02 PROCEDURE — 93925 LOWER EXTREMITY STUDY: CPT | Performed by: SURGERY

## 2024-07-02 PROCEDURE — 93978 VASCULAR STUDY: CPT

## 2024-07-02 PROCEDURE — 93925 LOWER EXTREMITY STUDY: CPT

## 2024-07-02 PROCEDURE — 93880 EXTRACRANIAL BILAT STUDY: CPT

## 2024-07-02 PROCEDURE — 93978 VASCULAR STUDY: CPT | Performed by: SURGERY

## 2024-07-02 PROCEDURE — 93922 UPR/L XTREMITY ART 2 LEVELS: CPT | Performed by: SURGERY

## 2024-07-02 PROCEDURE — 93880 EXTRACRANIAL BILAT STUDY: CPT | Performed by: SURGERY

## 2024-08-23 ENCOUNTER — OFFICE VISIT (OUTPATIENT)
Dept: VASCULAR SURGERY | Facility: CLINIC | Age: 79
End: 2024-08-23
Payer: MEDICARE

## 2024-08-23 VITALS
WEIGHT: 125 LBS | SYSTOLIC BLOOD PRESSURE: 152 MMHG | HEIGHT: 64 IN | HEART RATE: 54 BPM | DIASTOLIC BLOOD PRESSURE: 76 MMHG | BODY MASS INDEX: 21.34 KG/M2 | OXYGEN SATURATION: 97 %

## 2024-08-23 DIAGNOSIS — I77.1 CELIAC ARTERY STENOSIS (HCC): ICD-10-CM

## 2024-08-23 DIAGNOSIS — I70.213 ATHEROSCLEROSIS OF NATIVE ARTERY OF BOTH LOWER EXTREMITIES WITH INTERMITTENT CLAUDICATION (HCC): Primary | ICD-10-CM

## 2024-08-23 DIAGNOSIS — I70.1 RIGHT RENAL ARTERY STENOSIS (HCC): ICD-10-CM

## 2024-08-23 DIAGNOSIS — I65.23 BILATERAL CAROTID ARTERY STENOSIS: ICD-10-CM

## 2024-08-23 DIAGNOSIS — I71.43 INFRARENAL ABDOMINAL AORTIC ANEURYSM (AAA) WITHOUT RUPTURE (HCC): ICD-10-CM

## 2024-08-23 PROBLEM — I77.4 CELIAC ARTERY STENOSIS (HCC): Status: ACTIVE | Noted: 2024-08-23

## 2024-08-23 PROCEDURE — 99214 OFFICE O/P EST MOD 30 MIN: CPT | Performed by: NURSE PRACTITIONER

## 2024-08-23 NOTE — PROGRESS NOTES
Ambulatory Visit  Name: Rafia Bryant      : 1945      MRN: [id number]  Encounter Provider: THALIA Stark  Encounter Date: 2024   Encounter department: [facility] [place]    78-year-old female current smoker (1/2ppd) with HTN, HLD, smoker, CKD 3, aortic atherosclerosis, small AAA, PAD w/ bilateral SFA occlusion and intermittent bilateral calf claudication symptoms, mild bilateral carotid stenosis, chronic pain.  Returns the office for yearly visit to review carotid ultrasound, aortoiliac and lower extremity arterial duplex 24. She is currently asymptomatic.       Assessment & Plan   1. Atherosclerosis of native artery of both lower extremities with intermittent claudication (HCC)  Assessment & Plan:  24 LEAD  Rt:  Known occlusion SFA reconstitution of the proximal popliteal artery.  GEN: 0.83 (Prior:0.68)/ MP 85 mmHg/ GTP 54 mmHg     LEFT LOWER LIMB:  Known occlusion SFA with reconstitution in the SFA, 50-75% stenosis CFA, >75 % profunda femoris artery.  GEN: 0.70 (Prior:0.50)/ MP 60 mmHg/ GTP 20 mmHg    -Denies true claudication, denies true rest pain, no wounds/ tissue loss.   -Admits to L leg sciatic pain that is chronic in nature.   -Doppler B/L DP and PT pulses  -B/L feet are are warm and dry, no discoloration.    -Reviewed lower extremity ultrasound in detail with pt and answered all questions. Educated pt on peripheral arterial disease and indication for vascular intervention. No indications for vascular intervention at this time. Recommending continued surveillance with non-invasive imagining yearly with medical management at this time. Pt verbalized understanding.    Recommendations:  -Complete LEAD in one year and return to the office for review.  -Call the office with any new or worsening leg pain, discoloration, swelling, new wounds/ tissue loss.  -Continue to take aspirin 81 mg daily.  -Continue to take statin medication daily as per PCP.  -Do  daily foot checks, food protection, wear well fitted shoes.  -Increase daily walking for 20-30 min everyday.    Orders:  -     VAS ARTERIAL DUPLEX- LOWER LIMB BILATERAL; Future; Expected date: 07/04/2025  2. Celiac artery stenosis (HCC)  Assessment & Plan:  AOIL 7/2/24 demonstrates >70% celiac artery.  -Pt denies any mesenteric ischemic symptoms such as post prandial abdominal pain, food fear or unintentional weight loss.   -Reports diarrhea for the past month   -Has appointment with GI.  -She will be getting colonoscopy/ work up.  Orders:  -     VAS abdominal aorta/iliac duplex; Future; Expected date: 07/04/2025  3. Right renal artery stenosis (HCC)  Assessment & Plan:  AOIL from 7/2/24  >60% stenosis of the proximal right renal artery    -Reports it is well controlled 120-130/70-80, elevated in the office today. Reviewed prior office visit and BP at testing was controlled.    -Continue with ASA and statin  -No indication for vascular intervention  -Continue with yearly ultrasound  Orders:  -     VAS abdominal aorta/iliac duplex; Future; Expected date: 07/04/2025  4. Infrarenal abdominal aortic aneurysm (AAA) without rupture (HCC)  Assessment & Plan:  7/2/24 AOIL  Infrarenal abdominal aorta ectasia measuring 2.7 cm, Prior 2.8 cm.  -Denies abdominal pain/ back pain  -Has arthritis in her back, denies back pain.   -ASA and statin daily  -No indication for vascular intervention, continue with yearly follow up.  Orders:  -     VAS abdominal aorta/iliac duplex; Future; Expected date: 07/04/2025  5. Bilateral carotid artery stenosis  Assessment & Plan:  Reviewed carotid ultrasound from 7/2/2024 which demonstrates right ICA less than 50%/19 and a ratio of 0.89.  Left ICA less than 50% stenosis with velocities 81/29/20 0.80  -Denies symptoms of numbness/ tingling/ weakness on one side of the body, facial droop, slurred speech or blindness in one eye.   -Reviewed most recent carotid ultrasound results in detail with pt and  discussed pathophysiology of arterial disease and indication for vascular intervention. No vascular intervention planned at this time. Discussed that we will continue with medical management and non-invasive imaging at this time.     Plan  -Complete carotid ultrasound in 2 years and return to the office for review.  -Continue Aspirin 81mg daily.  -Continue Statin medication daily as prescribed by PCP.  -Call 911/ Go to the ER with any S/S of TIA/ CVA.  -Continue with BP control    Orders:  -     VAS carotid complete study; Future; Expected date: 07/06/2026      History of Present Illness     Rafia Bryant is a 78 y.o. female who presents current smoker (1/2ppd) with HTN, HLD, smoker, CKD 3, aortic atherosclerosis, small AAA, PAD w/ bilateral SFA occlusion and intermittent bilateral calf claudication symptoms, mild bilateral carotid stenosis, chronic pain.  Returns the office for yearly visit to review carotid ultrasound, aortoiliac and lower extremity arterial duplex 7/2/24. She is currently asymptomatic.     Pt presents to review BLAYNE/CV done 07/02/2024. Pt denies CVA/TIA symptoms including headaches, dizziness and visual disturbances. Pt denies claudication, rest pain and tissue loss. Pt is taking Atorvastatin. She smokes half ppd.    Pt presents to the office without complaints. She reports that she has had increase in diarrhea over the last month and is getting work up with GI. She reports she will have colonoscopy done.   -Pt denies any mesenteric ischemic symptoms such as post prandial abdominal pain, food fear or unintentional weight loss.   -Denies symptoms of numbness/ tingling/ weakness on one side of the body, facial droop, slurred speech or blindness in one eye.   -Denies abdominal/ back pain    She states she stopped taking aspirin years ago d/t taking ibuprofen. Discussed the importance of continuing with ASA 81 mg daily and she agreed to initiating taking again.    Review of Systems    Constitutional: Negative.    HENT: Negative.     Eyes: Negative.    Respiratory: Negative.     Cardiovascular: Negative.    Gastrointestinal: Negative.    Endocrine: Negative.    Genitourinary: Negative.    Musculoskeletal: Negative.    Skin: Negative.    Allergic/Immunologic: Negative.    Neurological: Negative.    Hematological: Negative.    Psychiatric/Behavioral: Negative.       Medical History Reviewed by provider this encounter:       Past Medical History   Past Medical History:   Diagnosis Date    AAA (abdominal aortic aneurysm) (HCC)     2.4 cm     History of colon polyps     Hyperlipidemia     Hypertension     PVD (peripheral vascular disease) (HCC)     femoral blockages     Past Surgical History:   Procedure Laterality Date    BACK SURGERY      herniated disc    COLONOSCOPY      [place] COLONOSCOPY FLX DX W/COLLJ SPEC WHEN PFRMD N/A 12/3/2018    Procedure: COLONOSCOPY;  Surgeon: Candi Michele MD;  Location: [facility];  Service: Colorectal     Family History   Problem Relation Age of Onset    Congenital heart disease Mother     Lung cancer Father     Alcohol abuse Father     Endometrial cancer Sister 52    Ovarian cancer Maternal Aunt 50    No Known Problems Daughter     No Known Problems Maternal Grandmother     No Known Problems Maternal Grandfather     No Known Problems Paternal Grandmother     No Known Problems Paternal Grandfather     No Known Problems Sister     No Known Problems Sister     Skin cancer Brother      Current Outpatient Medications on File Prior to Visit   Medication Sig Dispense Refill    ALPRAZolam (XANAX) 0.25 mg tablet       amLODIPine (NORVASC) 5 mg tablet       atorvastatin (LIPITOR) 40 mg tablet Take 40 mg by mouth      Calcium 250 MG CAPS Take by mouth      cholecalciferol (VITAMIN D3) 1,000 units tablet Take 1,000 Units by mouth      Flaxseed Oil OIL Take 1 tablet by mouth      hydrochlorothiazide (HYDRODIURIL) 12.5 mg tablet Take 12.5 mg by mouth daily  1    losartan  (COZAAR) 100 MG tablet Take 100 mg by mouth daily  1    losartan-hydrochlorothiazide (HYZAAR) 100-12.5 MG per tablet       MAGNESIUM PO Take 1 tablet by mouth every other day      metoprolol succinate (TOPROL-XL) 100 mg 24 hr tablet       Multiple Vitamins-Minerals (MULTIVITAMIN ADULT PO) Take 1 capsule by mouth      Thiamine HCl (VITAMIN B-1 PO) Take 1 tablet by mouth      aspirin 81 MG tablet Take 1 tablet by mouth daily       No current facility-administered medications on file prior to visit.     Allergies   Allergen Reactions    Codeine Vomiting      Current Outpatient Medications on File Prior to Visit   Medication Sig Dispense Refill    ALPRAZolam (XANAX) 0.25 mg tablet       amLODIPine (NORVASC) 5 mg tablet       atorvastatin (LIPITOR) 40 mg tablet Take 40 mg by mouth      Calcium 250 MG CAPS Take by mouth      cholecalciferol (VITAMIN D3) 1,000 units tablet Take 1,000 Units by mouth      Flaxseed Oil OIL Take 1 tablet by mouth      hydrochlorothiazide (HYDRODIURIL) 12.5 mg tablet Take 12.5 mg by mouth daily  1    losartan (COZAAR) 100 MG tablet Take 100 mg by mouth daily  1    losartan-hydrochlorothiazide (HYZAAR) 100-12.5 MG per tablet       MAGNESIUM PO Take 1 tablet by mouth every other day      metoprolol succinate (TOPROL-XL) 100 mg 24 hr tablet       Multiple Vitamins-Minerals (MULTIVITAMIN ADULT PO) Take 1 capsule by mouth      Thiamine HCl (VITAMIN B-1 PO) Take 1 tablet by mouth      aspirin 81 MG tablet Take 1 tablet by mouth daily       No current facility-administered medications on file prior to visit.      Social History     Tobacco Use    Smoking status: Every Day     Current packs/day: 0.50     Types: Cigarettes    Smokeless tobacco: Never    Tobacco comments:     last cigarette 3 weeks ago trying to quit   Vaping Use    Vaping status: Never Used   Substance and Sexual Activity    Alcohol use: Yes     Comment: wine daily    Drug use: No    Sexual activity: Not on file     Objective     BP  "152/76 (BP Location: Left arm, Patient Position: Sitting, Cuff Size: Standard)   Pulse (!) 54   Ht 5' 3.5\" (1.613 m)   Wt 56.7 kg (125 lb)   SpO2 97%   BMI 21.80 kg/m²     Physical Exam  Vitals and nursing note reviewed.   Constitutional:       General: She is not in acute distress.     Appearance: Normal appearance. She is normal weight. She is not ill-appearing.   HENT:      Head: Normocephalic and atraumatic.   Neck:      Vascular: Carotid bruit (R>L) present.   Cardiovascular:      Rate and Rhythm: Normal rate and regular rhythm.      Pulses:           Femoral pulses are 2+ on the right side and 1+ on the left side.       Popliteal pulses are 1+ on the right side and 0 on the left side.        Dorsalis pedis pulses are detected w/ Doppler on the right side and detected w/ Doppler on the left side.        Posterior tibial pulses are detected w/ Doppler on the right side and detected w/ Doppler on the left side.   Pulmonary:      Effort: Pulmonary effort is normal. No respiratory distress.      Breath sounds: Normal breath sounds.   Abdominal:      Palpations: Abdomen is soft. There is no mass.      Tenderness: There is no abdominal tenderness. There is no guarding.   Musculoskeletal:         General: No swelling or tenderness.      Cervical back: Normal range of motion and neck supple. No rigidity or tenderness.      Right lower leg: No edema.      Left lower leg: No edema.   Skin:     General: Skin is warm and dry.      Findings: No bruising, erythema, lesion or rash.   Neurological:      General: No focal deficit present.      Mental Status: She is alert and oriented to person, place, and time.      Sensory: Sensory deficit (b/l toe numbness L>R) present.      Motor: No weakness.      Gait: Gait normal.   Psychiatric:         Mood and Affect: Mood normal.         Behavior: Behavior normal.       Administrative Statements   I have spent a total time of 30 minutes in caring for this patient on the day of the " visit/encounter including Diagnostic results, Risks and benefits of tx options, Instructions for management, Patient and family education, Importance of tx compliance, Counseling / Coordination of care, Documenting in the medical record, Reviewing / ordering tests, medicine, procedures  , and Obtaining or reviewing history  .

## 2024-08-23 NOTE — ASSESSMENT & PLAN NOTE
Reviewed carotid ultrasound from 7/2/2024 which demonstrates right ICA less than 50%/19 and a ratio of 0.89.  Left ICA less than 50% stenosis with velocities 81/29/20 0.80  -Denies symptoms of numbness/ tingling/ weakness on one side of the body, facial droop, slurred speech or blindness in one eye.   -Reviewed most recent carotid ultrasound results in detail with pt and discussed pathophysiology of arterial disease and indication for vascular intervention. No vascular intervention planned at this time. Discussed that we will continue with medical management and non-invasive imaging at this time.     Plan  -Complete carotid ultrasound in 2 years and return to the office for review.  -Continue Aspirin 81mg daily.  -Continue Statin medication daily as prescribed by PCP.  -Call 911/ Go to the ER with any S/S of TIA/ CVA.  -Continue with BP control

## 2024-08-23 NOTE — ASSESSMENT & PLAN NOTE
7/2/24 AOIL  Infrarenal abdominal aorta ectasia measuring 2.7 cm, Prior 2.8 cm.  -Denies abdominal pain/ back pain  -Has arthritis in her back, denies back pain.   -ASA and statin daily  -No indication for vascular intervention, continue with yearly follow up.

## 2024-08-23 NOTE — ASSESSMENT & PLAN NOTE
AOIL 7/2/24 demonstrates >70% celiac artery.  -Pt denies any mesenteric ischemic symptoms such as post prandial abdominal pain, food fear or unintentional weight loss.   -Reports diarrhea for the past month   -Has appointment with GI.  -She will be getting colonoscopy/ work up.

## 2024-08-23 NOTE — ASSESSMENT & PLAN NOTE
AOIL from 7/2/24  >60% stenosis of the proximal right renal artery    -Reports it is well controlled 120-130/70-80, elevated in the office today. Reviewed prior office visit and BP at testing was controlled.    -Continue with ASA and statin  -No indication for vascular intervention  -Continue with yearly ultrasound

## 2024-08-23 NOTE — ASSESSMENT & PLAN NOTE
7/2/24 LEAD  Rt:  Known occlusion SFA reconstitution of the proximal popliteal artery.  GEN: 0.83 (Prior:0.68)/ MP 85 mmHg/ GTP 54 mmHg     LEFT LOWER LIMB:  Known occlusion SFA with reconstitution in the SFA, 50-75% stenosis CFA, >75 % profunda femoris artery.  GEN: 0.70 (Prior:0.50)/ MP 60 mmHg/ GTP 20 mmHg    -Denies true claudication, denies true rest pain, no wounds/ tissue loss.   -Admits to L leg sciatic pain that is chronic in nature.   -Doppler B/L DP and PT pulses  -B/L feet are are warm and dry, no discoloration.    -Reviewed lower extremity ultrasound in detail with pt and answered all questions. Educated pt on peripheral arterial disease and indication for vascular intervention. No indications for vascular intervention at this time. Recommending continued surveillance with non-invasive imagining yearly with medical management at this time. Pt verbalized understanding.    Recommendations:  -Complete LEAD in one year and return to the office for review.  -Call the office with any new or worsening leg pain, discoloration, swelling, new wounds/ tissue loss.  -Continue to take aspirin 81 mg daily.  -Continue to take statin medication daily as per PCP.  -Do daily foot checks, food protection, wear well fitted shoes.  -Increase daily walking for 20-30 min everyday.

## 2024-12-03 ENCOUNTER — HOSPITAL ENCOUNTER (OUTPATIENT)
Dept: RADIOLOGY | Age: 79
Discharge: HOME | End: 2024-12-03
Payer: MEDICARE

## 2024-12-03 DIAGNOSIS — Z12.31 VISIT FOR SCREENING MAMMOGRAM: ICD-10-CM

## 2024-12-03 PROCEDURE — 77063 BREAST TOMOSYNTHESIS BI: CPT

## 2024-12-03 PROCEDURE — 77067 SCR MAMMO BI INCL CAD: CPT

## 2025-07-07 ENCOUNTER — HOSPITAL ENCOUNTER (OUTPATIENT)
Dept: NON INVASIVE DIAGNOSTICS | Facility: CLINIC | Age: 80
Discharge: HOME | End: 2025-07-07
Payer: MEDICARE

## 2025-07-07 ENCOUNTER — RESULTS FOLLOW-UP (OUTPATIENT)
Dept: OTHER | Facility: HOSPITAL | Age: 80
End: 2025-07-07

## 2025-07-07 DIAGNOSIS — I70.1 RIGHT RENAL ARTERY STENOSIS (HCC): ICD-10-CM

## 2025-07-07 DIAGNOSIS — I71.43 INFRARENAL ABDOMINAL AORTIC ANEURYSM (AAA) WITHOUT RUPTURE (HCC): ICD-10-CM

## 2025-07-07 DIAGNOSIS — I77.4 CELIAC ARTERY STENOSIS (HCC): ICD-10-CM

## 2025-07-07 DIAGNOSIS — I70.213 ATHEROSCLEROSIS OF NATIVE ARTERY OF BOTH LOWER EXTREMITIES WITH INTERMITTENT CLAUDICATION (HCC): ICD-10-CM

## 2025-07-07 PROCEDURE — 93925 LOWER EXTREMITY STUDY: CPT | Performed by: SURGERY

## 2025-07-07 PROCEDURE — 93925 LOWER EXTREMITY STUDY: CPT

## 2025-07-07 PROCEDURE — 93923 UPR/LXTR ART STDY 3+ LVLS: CPT

## 2025-07-07 PROCEDURE — 93923 UPR/LXTR ART STDY 3+ LVLS: CPT | Performed by: SURGERY

## 2025-07-07 PROCEDURE — 93978 VASCULAR STUDY: CPT | Performed by: SURGERY

## 2025-07-07 PROCEDURE — 93978 VASCULAR STUDY: CPT

## 2025-08-18 ENCOUNTER — TELEPHONE (OUTPATIENT)
Dept: VASCULAR SURGERY | Facility: CLINIC | Age: 80
End: 2025-08-18